# Patient Record
Sex: MALE | Race: WHITE | NOT HISPANIC OR LATINO | Employment: UNEMPLOYED | URBAN - METROPOLITAN AREA
[De-identification: names, ages, dates, MRNs, and addresses within clinical notes are randomized per-mention and may not be internally consistent; named-entity substitution may affect disease eponyms.]

---

## 2018-12-01 ENCOUNTER — HOSPITAL ENCOUNTER (INPATIENT)
Facility: HOSPITAL | Age: 48
LOS: 3 days | Discharge: HOME | DRG: 200 | End: 2018-12-04
Attending: EMERGENCY MEDICINE | Admitting: INTERNAL MEDICINE
Payer: MEDICAID

## 2018-12-01 DIAGNOSIS — S22.41XA CLOSED FRACTURE OF MULTIPLE RIBS OF RIGHT SIDE, INITIAL ENCOUNTER: Primary | ICD-10-CM

## 2018-12-01 DIAGNOSIS — J93.9 PNEUMOTHORAX: ICD-10-CM

## 2018-12-01 DIAGNOSIS — S27.0XXA TRAUMATIC PNEUMOTHORAX, INITIAL ENCOUNTER: ICD-10-CM

## 2018-12-01 DIAGNOSIS — R06.02 SHORTNESS OF BREATH: ICD-10-CM

## 2018-12-01 DIAGNOSIS — Z96.89 CHEST TUBE IN PLACE: ICD-10-CM

## 2018-12-01 DIAGNOSIS — R07.81 RIB PAIN ON RIGHT SIDE: ICD-10-CM

## 2018-12-01 PROBLEM — Z72.0 TOBACCO ABUSE: Status: ACTIVE | Noted: 2018-12-01

## 2018-12-01 PROBLEM — F10.10 ETOH ABUSE: Status: ACTIVE | Noted: 2018-12-01

## 2018-12-01 PROBLEM — I10 ESSENTIAL HYPERTENSION: Status: ACTIVE | Noted: 2018-12-01

## 2018-12-01 LAB
ALBUMIN SERPL BCP-MCNC: 4 G/DL
ALP SERPL-CCNC: 92 U/L
ALT SERPL W/O P-5'-P-CCNC: 17 U/L
ANION GAP SERPL CALC-SCNC: 15 MMOL/L
AST SERPL-CCNC: 27 U/L
BASOPHILS # BLD AUTO: 0.03 K/UL
BASOPHILS NFR BLD: 0.3 %
BILIRUB SERPL-MCNC: 0.7 MG/DL
BUN SERPL-MCNC: 16 MG/DL
CALCIUM SERPL-MCNC: 10.7 MG/DL
CHLORIDE SERPL-SCNC: 101 MMOL/L
CO2 SERPL-SCNC: 20 MMOL/L
CREAT SERPL-MCNC: 0.9 MG/DL
DIFFERENTIAL METHOD: ABNORMAL
EOSINOPHIL # BLD AUTO: 0.4 K/UL
EOSINOPHIL NFR BLD: 3.2 %
ERYTHROCYTE [DISTWIDTH] IN BLOOD BY AUTOMATED COUNT: 12.6 %
EST. GFR  (AFRICAN AMERICAN): >60 ML/MIN/1.73 M^2
EST. GFR  (NON AFRICAN AMERICAN): >60 ML/MIN/1.73 M^2
GLUCOSE SERPL-MCNC: 102 MG/DL
HCT VFR BLD AUTO: 48.6 %
HGB BLD-MCNC: 17.6 G/DL
LYMPHOCYTES # BLD AUTO: 2.3 K/UL
LYMPHOCYTES NFR BLD: 20.7 %
MCH RBC QN AUTO: 35.1 PG
MCHC RBC AUTO-ENTMCNC: 36.2 G/DL
MCV RBC AUTO: 97 FL
MONOCYTES # BLD AUTO: 1.3 K/UL
MONOCYTES NFR BLD: 11.9 %
NEUTROPHILS # BLD AUTO: 7 K/UL
NEUTROPHILS NFR BLD: 63.9 %
PLATELET # BLD AUTO: 329 K/UL
PLATELET BLD QL SMEAR: ABNORMAL
PMV BLD AUTO: 10.8 FL
POTASSIUM SERPL-SCNC: 3.7 MMOL/L
PROT SERPL-MCNC: 8.6 G/DL
RBC # BLD AUTO: 5.01 M/UL
SODIUM SERPL-SCNC: 136 MMOL/L
TROPONIN I SERPL DL<=0.01 NG/ML-MCNC: <0.006 NG/ML
WBC # BLD AUTO: 10.88 K/UL

## 2018-12-01 PROCEDURE — 27000221 HC OXYGEN, UP TO 24 HOURS

## 2018-12-01 PROCEDURE — 32551 INSERTION OF CHEST TUBE: CPT

## 2018-12-01 PROCEDURE — 11000001 HC ACUTE MED/SURG PRIVATE ROOM

## 2018-12-01 PROCEDURE — 93005 ELECTROCARDIOGRAM TRACING: CPT

## 2018-12-01 PROCEDURE — 63600175 PHARM REV CODE 636 W HCPCS: Performed by: REGISTERED NURSE

## 2018-12-01 PROCEDURE — 99291 CRITICAL CARE FIRST HOUR: CPT | Mod: 25

## 2018-12-01 PROCEDURE — 21400001 HC TELEMETRY ROOM

## 2018-12-01 PROCEDURE — 25000003 PHARM REV CODE 250: Performed by: EMERGENCY MEDICINE

## 2018-12-01 PROCEDURE — 80053 COMPREHEN METABOLIC PANEL: CPT

## 2018-12-01 PROCEDURE — 63600175 PHARM REV CODE 636 W HCPCS: Performed by: NURSE PRACTITIONER

## 2018-12-01 PROCEDURE — 96374 THER/PROPH/DIAG INJ IV PUSH: CPT

## 2018-12-01 PROCEDURE — 36415 COLL VENOUS BLD VENIPUNCTURE: CPT

## 2018-12-01 PROCEDURE — 96372 THER/PROPH/DIAG INJ SC/IM: CPT | Mod: 59

## 2018-12-01 PROCEDURE — 84484 ASSAY OF TROPONIN QUANT: CPT

## 2018-12-01 PROCEDURE — 0W9930Z DRAINAGE OF RIGHT PLEURAL CAVITY WITH DRAINAGE DEVICE, PERCUTANEOUS APPROACH: ICD-10-PCS | Performed by: EMERGENCY MEDICINE

## 2018-12-01 PROCEDURE — 63600175 PHARM REV CODE 636 W HCPCS: Performed by: EMERGENCY MEDICINE

## 2018-12-01 PROCEDURE — 93010 ELECTROCARDIOGRAM REPORT: CPT | Mod: ,,, | Performed by: INTERNAL MEDICINE

## 2018-12-01 PROCEDURE — 85025 COMPLETE CBC W/AUTO DIFF WBC: CPT

## 2018-12-01 RX ORDER — CHLORDIAZEPOXIDE HYDROCHLORIDE 10 MG/1
10 CAPSULE, GELATIN COATED ORAL 4 TIMES DAILY PRN
Status: DISCONTINUED | OUTPATIENT
Start: 2018-12-01 | End: 2018-12-02

## 2018-12-01 RX ORDER — MORPHINE SULFATE 4 MG/ML
4 INJECTION, SOLUTION INTRAMUSCULAR; INTRAVENOUS EVERY 4 HOURS PRN
Status: DISCONTINUED | OUTPATIENT
Start: 2018-12-01 | End: 2018-12-01 | Stop reason: SDUPTHER

## 2018-12-01 RX ORDER — MORPHINE SULFATE 4 MG/ML
6 INJECTION, SOLUTION INTRAMUSCULAR; INTRAVENOUS
Status: COMPLETED | OUTPATIENT
Start: 2018-12-01 | End: 2018-12-01

## 2018-12-01 RX ORDER — ETOMIDATE 2 MG/ML
10 INJECTION INTRAVENOUS
Status: COMPLETED | OUTPATIENT
Start: 2018-12-01 | End: 2018-12-01

## 2018-12-01 RX ORDER — HYDROCODONE BITARTRATE AND ACETAMINOPHEN 7.5; 325 MG/1; MG/1
1 TABLET ORAL EVERY 6 HOURS PRN
Status: DISCONTINUED | OUTPATIENT
Start: 2018-12-01 | End: 2018-12-02

## 2018-12-01 RX ORDER — ACETAMINOPHEN 325 MG/1
650 TABLET ORAL EVERY 8 HOURS PRN
Status: DISCONTINUED | OUTPATIENT
Start: 2018-12-01 | End: 2018-12-01 | Stop reason: SDUPTHER

## 2018-12-01 RX ORDER — LIDOCAINE HYDROCHLORIDE 10 MG/ML
10 INJECTION, SOLUTION EPIDURAL; INFILTRATION; INTRACAUDAL; PERINEURAL
Status: COMPLETED | OUTPATIENT
Start: 2018-12-01 | End: 2018-12-01

## 2018-12-01 RX ORDER — MORPHINE SULFATE 4 MG/ML
2 INJECTION, SOLUTION INTRAMUSCULAR; INTRAVENOUS EVERY 6 HOURS PRN
Status: DISCONTINUED | OUTPATIENT
Start: 2018-12-01 | End: 2018-12-02

## 2018-12-01 RX ORDER — HYDROMORPHONE HYDROCHLORIDE 2 MG/ML
1 INJECTION, SOLUTION INTRAMUSCULAR; INTRAVENOUS; SUBCUTANEOUS
Status: COMPLETED | OUTPATIENT
Start: 2018-12-01 | End: 2018-12-01

## 2018-12-01 RX ORDER — ACETAMINOPHEN 325 MG/1
650 TABLET ORAL EVERY 6 HOURS PRN
Status: DISCONTINUED | OUTPATIENT
Start: 2018-12-01 | End: 2018-12-04 | Stop reason: HOSPADM

## 2018-12-01 RX ORDER — ONDANSETRON 2 MG/ML
4 INJECTION INTRAMUSCULAR; INTRAVENOUS EVERY 12 HOURS PRN
Status: DISCONTINUED | OUTPATIENT
Start: 2018-12-01 | End: 2018-12-01 | Stop reason: SDUPTHER

## 2018-12-01 RX ORDER — ONDANSETRON 2 MG/ML
4 INJECTION INTRAMUSCULAR; INTRAVENOUS EVERY 8 HOURS PRN
Status: DISCONTINUED | OUTPATIENT
Start: 2018-12-01 | End: 2018-12-04 | Stop reason: HOSPADM

## 2018-12-01 RX ORDER — HYDROCODONE BITARTRATE AND ACETAMINOPHEN 5; 325 MG/1; MG/1
1 TABLET ORAL EVERY 4 HOURS PRN
Status: DISCONTINUED | OUTPATIENT
Start: 2018-12-01 | End: 2018-12-01 | Stop reason: SDUPTHER

## 2018-12-01 RX ORDER — PROMETHAZINE HYDROCHLORIDE 25 MG/ML
25 INJECTION, SOLUTION INTRAMUSCULAR; INTRAVENOUS
Status: COMPLETED | OUTPATIENT
Start: 2018-12-01 | End: 2018-12-01

## 2018-12-01 RX ORDER — HYDRALAZINE HYDROCHLORIDE 20 MG/ML
10 INJECTION INTRAMUSCULAR; INTRAVENOUS EVERY 6 HOURS PRN
Status: DISCONTINUED | OUTPATIENT
Start: 2018-12-01 | End: 2018-12-04 | Stop reason: HOSPADM

## 2018-12-01 RX ADMIN — ETOMIDATE 10 MG: 2 INJECTION INTRAVENOUS at 05:12

## 2018-12-01 RX ADMIN — MORPHINE SULFATE 6 MG: 4 INJECTION, SOLUTION INTRAMUSCULAR; INTRAVENOUS at 04:12

## 2018-12-01 RX ADMIN — MORPHINE SULFATE 2 MG: 4 INJECTION INTRAVENOUS at 11:12

## 2018-12-01 RX ADMIN — LIDOCAINE HYDROCHLORIDE 100 MG: 10 INJECTION, SOLUTION EPIDURAL; INFILTRATION; INTRACAUDAL; PERINEURAL at 05:12

## 2018-12-01 RX ADMIN — PROMETHAZINE HYDROCHLORIDE 25 MG: 25 INJECTION INTRAMUSCULAR; INTRAVENOUS at 04:12

## 2018-12-01 RX ADMIN — HYDROMORPHONE HYDROCHLORIDE 1 MG: 2 INJECTION INTRAMUSCULAR; INTRAVENOUS; SUBCUTANEOUS at 07:12

## 2018-12-01 NOTE — ED PROVIDER NOTES
"SCRIBE #1 NOTE: I, Kelly Rodríguez, am scribing for, and in the presence of, To Peoples Jr., NP. I have scribed the HPI, ROS, and PEx.     SCRIBE #2 NOTE: I, Mathieu Preston , am scribing for, and in the presence of,  Hugo Blankenship MD. I have scribed the remaining portions of the note not scribed by Scribe #1.     History      Chief Complaint   Patient presents with    Fall     pt tripped and fell yesterday, hit R chest wall on chair, pt states, "I think I broke my rib"       Review of patient's allergies indicates:   Allergen Reactions    Pcn [penicillins]         HPI   HPI    12/1/2018, 4:06 PM   History obtained from the patient      History of Present Illness: Lucas Bernal II is a 48 y.o. male patient with a PMHx of Hep C who presents to the Emergency Department for R rib pain which onset after tripping and falling yesterday. Pt reports hitting his R chest wall on a table. Pt has had a previous R rib injury/fracture. Symptoms are constant and moderate in severity.  No mitigating or exacerbating factors reported. No associated sxs reported. Patient denies any head injury/trauma, LOC,  HA, dizziness, extremity weakness/numbness, n/v, syncope, back pain, neck pain, abd pain, CP, SOB, other pain/injury, cough, and all other sxs at this time. Pt is a current daily smoker.  No further complaints or concerns at this time.         Arrival mode: Personal vehicle      PCP: Primary Doctor No       Past Medical History:  History reviewed. No pertinent past medical history.    Past Surgical History:  Past Surgical History:   Procedure Laterality Date    HERNIA REPAIR           Family History:  History reviewed. No pertinent family history.    Social History:  Social History     Tobacco Use    Smoking status: Current Every Day Smoker     Packs/day: 0.50     Types: Cigarettes   Substance and Sexual Activity    Alcohol use: No    Drug use: No    Sexual activity: Unknown       ROS   Review of Systems "   Constitutional: Negative for chills, diaphoresis and fever.   HENT: Negative for sore throat.    Respiratory: Negative for cough and shortness of breath.    Cardiovascular: Negative for chest pain.   Gastrointestinal: Negative for abdominal pain, nausea and vomiting.   Genitourinary: Negative for dysuria.   Musculoskeletal: Negative for back pain and neck pain.        + R rib pain -other pain/injury   Skin: Negative for rash.   Neurological: Negative for dizziness, syncope, weakness, numbness and headaches.        -head injury/trauma -LOC   Hematological: Does not bruise/bleed easily.   All other systems reviewed and are negative.      Physical Exam      Initial Vitals [12/01/18 1557]   BP Pulse Resp Temp SpO2   129/79 109 20 98 °F (36.7 °C) 96 %      MAP       --          Physical Exam  Nursing Notes and Vital Signs Reviewed.  Constitutional: Patient is in no acute distress.   Head: Atraumatic. Normocephalic.  Eyes: PERRL. EOM intact. Conjunctivae are not pale. No scleral icterus.  ENT: Mucous membranes are moist. Oropharynx is clear and symmetric.    Neck: Supple. Full ROM. No lymphadenopathy.  Cardiovascular: Regular rate. Regular rhythm. No murmurs, rubs, or gallops. Distal pulses are 2+ and symmetric.  Pulmonary/Chest: No respiratory distress. Clear to auscultation bilaterally. No wheezing or rales. R lateral rib tenderness.  Abdominal: Soft and non-distended.  There is no tenderness.  No rebound, guarding, or rigidity.   Musculoskeletal: Moves all extremities. No obvious deformities. No edema.   Skin: Warm and dry.  Neurological:  Alert, awake, and appropriate.  Normal speech.  No acute focal neurological deficits are appreciated.  Psychiatric: Normal affect. Good eye contact. Appropriate in content.    ED Course    Chest Tube  Date/Time: 12/1/2018 5:44 PM  Performed by: Hugo Blankenship Jr., MD  Authorized by: Hugo Blankenship Jr., MD   Consent Done: Yes  Consent: Written consent obtained.  Risks and  benefits: risks, benefits and alternatives were discussed  Consent given by: patient  Patient understanding: patient states understanding of the procedure being performed  Patient consent: the patient's understanding of the procedure matches consent given  Procedure consent: procedure consent matches procedure scheduled  Required items: required blood products, implants, devices, and special equipment available  Patient identity confirmed: name,  and MRN  Indications: pneumothorax  Patient sedated: yes  Tube size: 16 Frisian  Dressing: 4x4 sterile gauze  Post-insertion x-ray findings: tube in good position  Patient tolerance: Patient tolerated the procedure well with no immediate complications  Complications: No  Specimens: No  Implants: No    Procedural Sedation  Date/Time: 2018 5:44 PM  Performed by: Hugo Blankenship Jr., MD  Authorized by: Hugo Blankenship Jr., MD   Consent Done: Yes  Consent: Written consent obtained.  Risks and benefits: risks, benefits and alternatives were discussed  Consent given by: patient  Patient understanding: patient states understanding of the procedure being performed  Required items: required blood products, implants, devices, and special equipment available  Patient identity confirmed: , MRN and name  Equipment: on cardiac monitor., on CO2 monitor., on BP monitor., on supplemental oxygen., suction available. and airway equipment available.   Sedatives: etomidate (10 mg)  Sedation start date/time: 2018 5:46 PM  Sedation end date/time: 2018 6:06 PM  Vitals: Vital signs were monitored during sedation.  Complications: No complications.     Critical Care  Date/Time: 2018 6:40 PM  Performed by: Hugo Blankenship Jr., MD  Authorized by: Hugo Blankenship Jr., MD   Direct patient critical care time: 20 minutes  Additional history critical care time: 2 minutes  Ordering / reviewing critical care time: 4 minutes  Documentation critical care time: 4 minutes  Total  critical care time (exclusive of procedural time) : 30 minutes  Critical care time was exclusive of separately billable procedures and treating other patients and teaching time.  Critical care was necessary to treat or prevent imminent or life-threatening deterioration of the following conditions: Pneumothorax.  Critical care was time spent personally by me on the following activities: blood draw for specimens, development of treatment plan with patient or surrogate, interpretation of cardiac output measurements, evaluation of patient's response to treatment, examination of patient, obtaining history from patient or surrogate, ordering and performing treatments and interventions, ordering and review of laboratory studies, ordering and review of radiographic studies, pulse oximetry and re-evaluation of patient's condition.        ED Vital Signs:  Vitals:    12/01/18 1733 12/01/18 1748 12/01/18 1752 12/01/18 1752   BP: (!) 147/94 (!) 156/104  (!) 165/98   Pulse: 93 77     Resp: 20 (!) 22     Temp:       TempSrc:       SpO2: 100% 100% 100%    Weight:       Height:        12/01/18 1758 12/01/18 1800 12/01/18 1802 12/01/18 1805   BP: (!) 157/89 (!) 178/96 (!) 178/96 (!) 196/113   Pulse: 84 86 84 86   Resp: 16 12 (!) 27 16   Temp:       TempSrc:       SpO2: 100% 100% 100% 100%   Weight:       Height:        12/01/18 1810 12/01/18 1821 12/01/18 1825 12/01/18 1830   BP: (!) 190/108 (!) 177/105 (!) 175/104 (!) 184/99   Pulse: 82 67 84 82   Resp: 18 18 18 (!) 21   Temp:       TempSrc:       SpO2: 100% 100% 100% 100%   Weight:       Height:        12/01/18 1835 12/01/18 1840 12/01/18 1841   BP: (!) 168/103 (!) 175/101    Pulse: 76 74    Resp: 18 16    Temp:      TempSrc:      SpO2: 100% 100% 100%   Weight:      Height:          Abnormal Lab Results:  Labs Reviewed   CBC W/ AUTO DIFFERENTIAL - Abnormal; Notable for the following components:       Result Value    MCH 35.1 (*)     MCHC 36.2 (*)     Mono # 1.3 (*)     All other  components within normal limits   COMPREHENSIVE METABOLIC PANEL - Abnormal; Notable for the following components:    CO2 20 (*)     Calcium 10.7 (*)     Total Protein 8.6 (*)     All other components within normal limits   TROPONIN I   URINALYSIS, REFLEX TO URINE CULTURE        All Lab Results:  Results for orders placed or performed during the hospital encounter of 12/01/18   CBC auto differential   Result Value Ref Range    WBC 10.88 3.90 - 12.70 K/uL    RBC 5.01 4.60 - 6.20 M/uL    Hemoglobin 17.6 14.0 - 18.0 g/dL    Hematocrit 48.6 40.0 - 54.0 %    MCV 97 82 - 98 fL    MCH 35.1 (H) 27.0 - 31.0 pg    MCHC 36.2 (H) 32.0 - 36.0 g/dL    RDW 12.6 11.5 - 14.5 %    Platelets 329 150 - 350 K/uL    MPV 10.8 9.2 - 12.9 fL    Gran # (ANC) 7.0 1.8 - 7.7 K/uL    Lymph # 2.3 1.0 - 4.8 K/uL    Mono # 1.3 (H) 0.3 - 1.0 K/uL    Eos # 0.4 0.0 - 0.5 K/uL    Baso # 0.03 0.00 - 0.20 K/uL    Gran% 63.9 38.0 - 73.0 %    Lymph% 20.7 18.0 - 48.0 %    Mono% 11.9 4.0 - 15.0 %    Eosinophil% 3.2 0.0 - 8.0 %    Basophil% 0.3 0.0 - 1.9 %    Platelet Estimate Appears normal     Differential Method Automated    Comprehensive metabolic panel   Result Value Ref Range    Sodium 136 136 - 145 mmol/L    Potassium 3.7 3.5 - 5.1 mmol/L    Chloride 101 95 - 110 mmol/L    CO2 20 (L) 23 - 29 mmol/L    Glucose 102 70 - 110 mg/dL    BUN, Bld 16 6 - 20 mg/dL    Creatinine 0.9 0.5 - 1.4 mg/dL    Calcium 10.7 (H) 8.7 - 10.5 mg/dL    Total Protein 8.6 (H) 6.0 - 8.4 g/dL    Albumin 4.0 3.5 - 5.2 g/dL    Total Bilirubin 0.7 0.1 - 1.0 mg/dL    Alkaline Phosphatase 92 55 - 135 U/L    AST 27 10 - 40 U/L    ALT 17 10 - 44 U/L    Anion Gap 15 8 - 16 mmol/L    eGFR if African American >60 >60 mL/min/1.73 m^2    eGFR if non African American >60 >60 mL/min/1.73 m^2   Troponin I   Result Value Ref Range    Troponin I <0.006 0.000 - 0.026 ng/mL         Imaging Results:  Imaging Results          X-Ray Chest 1 View (Final result)  Result time 12/01/18 18:37:59    Final  result by Benjamin Lobo MD (12/01/18 18:37:59)                 Impression:      See above.      Electronically signed by: Benjamin Lobo MD  Date:    12/01/2018  Time:    18:37             Narrative:    EXAMINATION:  XR CHEST 1 VIEW    CLINICAL HISTORY:  Right-sided chest tube placement.  Right-sided pneumothorax.    FINDINGS:  Right chest tube tip projects to the right hilum.  No residual pneumothorax noted.  There is some subcutaneous emphysema.                               X-Ray Chest PA And Lateral (Final result)  Result time 12/01/18 17:21:30    Final result by Benjamin Lobo MD (12/01/18 17:21:30)                 Impression:      Right-sided pneumothorax as described above.  Multiple right-sided rib fractures.  See rib dictation.      Electronically signed by: Benjamin Lobo MD  Date:    12/01/2018  Time:    17:21             Narrative:    EXAMINATION:  XR CHEST PA AND LATERAL    CLINICAL HISTORY:  Right-sided pneumothorax    COMPARISON:  Earlier single-view chest x-ray.    FINDINGS:  Multiple right-sided rib fractures again noted.  Right-sided pneumothorax noted predominantly along the right lower lateral lung base and along the right lung base.  There is also air tracking along the medial aspect of the right lung.  No air at the right apex likely related to extensive scarring in the right upper lobe.                               X-Ray Ribs 2 View Right (Final result)  Result time 12/01/18 17:10:05    Final result by Benjamin Lobo MD (12/01/18 17:10:05)                 Impression:      Multiple right-sided rib fractures with right-sided pneumothorax.  Please see chest x-ray dictation.      Electronically signed by: Benjamin Lobo MD  Date:    12/01/2018  Time:    17:10             Narrative:    EXAMINATION:  XR RIBS 2 VIEW RIGHT    CLINICAL HISTORY:  Pleurodynia, chest pain.  Trauma, initial encounter.    COMPARISON:  Chest x-ray    FINDINGS:  There are fractures involving the posterior right 56 8 and 9th ribs.   Right-sided pneumothorax tracking down the right lateral chest wall and along the right lung base.  There is subcutaneous emphysema.                               X-Ray Chest 1 View (Final result)  Result time 12/01/18 17:08:50    Final result by Benjamin Lobo MD (12/01/18 17:08:50)                 Impression:      Multiple right-sided rib fractures with right-sided pneumothorax.  Finding identified at 17:05 hours and discussed with Dr. Andino in the emergency room at 17:07 hours.      Electronically signed by: Benjamin Lobo MD  Date:    12/01/2018  Time:    17:08             Narrative:    EXAMINATION:  XR CHEST 1 VIEW    CLINICAL HISTORY:  Chest trauma, initial encounter    FINDINGS:  There are fractures involving the right posterior 5th 6th 8th and 9th ribs with a moderate right-sided pneumothorax tracking up the right lateral chest wall.  The heart size is normal.  No mediastinal shift.  Scarring right pulmonary apex.  The left lung is clear.                               The EKG was ordered, reviewed, and independently interpreted by the ED provider.  Interpretation time: 1715  Rate: 80 BPM  Rhythm: normal sinus rhythm with sinus arrhythmia  Interpretation: Right atrial enlargement. No STEMI.             The Emergency Provider reviewed the vital signs and test results, which are outlined above.    ED Discussion     5:15 PM: To Peoples Jr., NP discussed the pt's case with Dr. Pimentel (General Surgery) who recommends chest tube (16 Lithuanian) and admission to Hospital Medicine.    5:24 PM: To Peoples Jr., NP transfers care of pt to Dr. Blankenship for chest tube placement and pending admission.    5:42 PM: To Peoples Jr., NP discussed case with Faye Norton NP (Hospital Medicine). Dr. Prince agrees with current care and management of pt and accepts admission.   Admitting Service: Hospital medicine   Admitting Physician: Dr. Prince  Admit to: Med-Tele      6:41 PM: Re-evaluated pt. I have discussed test  results, shared treatment plan, and the need for admission with patient and family at bedside. Pt and family express understanding at this time and agree with all information. All questions answered. Pt and family have no further questions or concerns at this time. Pt is ready for admit.    6:50 PM  Patient is stable nontoxic head admit.  Dr. Prince is admitting.  Surgery will be managing chest tube.  Chest tube was placed by myself in the emergency department.  The chest x-ray confirms placement.    ED Medication(s):  Medications   hydromorphone (PF) injection 1 mg (not administered)   ondansetron injection 4 mg (not administered)   acetaminophen tablet 650 mg (not administered)   promethazine (PHENERGAN) 6.25 mg in dextrose 5 % 50 mL IVPB (not administered)   morphine injection 2 mg (not administered)   morphine injection 6 mg (6 mg Intramuscular Given 12/1/18 1621)   promethazine injection 25 mg (25 mg Intramuscular Given 12/1/18 1622)   etomidate injection 10 mg (10 mg Intravenous Given 12/1/18 1744)   lidocaine (PF) 10 mg/ml (1%) injection 100 mg (100 mg Infiltration Given 12/1/18 1742)             Medical Decision Making    Medical Decision Making:   Clinical Tests:   Lab Tests: Ordered and Reviewed  Radiological Study: Ordered and Reviewed  Medical Tests: Ordered and Reviewed           Scribe Attestation:   Scribe #1: I performed the above scribed service and the documentation accurately describes the services I performed. I attest to the accuracy of the note.    Attending:   Physician Attestation Statement for Scribe #1: I, To Peoples Jr., NP , personally performed the services described in this documentation, as scribed by Kelly Rodríguez, in my presence, and it is both accurate and complete.       Scribe Attestation:   Scribe #2: I performed the above scribed service and the documentation accurately describes the services I performed. I attest to the accuracy of the note.    Attending  Attestation:           Physician Attestation for Scribe:    Physician Attestation Statement for Scribe #2: I, Hugo Blankenship MD, reviewed documentation, as scribed by Mathieu Preston in my presence, and it is both accurate and complete. I also acknowledge and confirm the content of the note done by Scribe #1.          Clinical Impression       ICD-10-CM ICD-9-CM   1. Rib pain on right side R07.81 786.50   2. Rib pain on right side R07.81 786.50   3. Pneumothorax J93.9 512.89   4. Shortness of breath R06.02 786.05   5. Chest tube in place Z96.89 V49.89       Disposition:   Disposition: Admitted  Condition: Serious         Hugo Blankenship Jr., MD  12/01/18 2924

## 2018-12-02 LAB
ALBUMIN SERPL BCP-MCNC: 3.1 G/DL
ALP SERPL-CCNC: 75 U/L
ALT SERPL W/O P-5'-P-CCNC: 14 U/L
ANION GAP SERPL CALC-SCNC: 10 MMOL/L
ANION GAP SERPL CALC-SCNC: 12 MMOL/L
AST SERPL-CCNC: 24 U/L
BASOPHILS # BLD AUTO: 0.04 K/UL
BASOPHILS NFR BLD: 0.4 %
BILIRUB SERPL-MCNC: 0.6 MG/DL
BILIRUB UR QL STRIP: NEGATIVE
BUN SERPL-MCNC: 13 MG/DL
BUN SERPL-MCNC: 14 MG/DL
CALCIUM SERPL-MCNC: 9.2 MG/DL
CALCIUM SERPL-MCNC: 9.3 MG/DL
CHLORIDE SERPL-SCNC: 101 MMOL/L
CHLORIDE SERPL-SCNC: 102 MMOL/L
CLARITY UR: CLEAR
CO2 SERPL-SCNC: 23 MMOL/L
CO2 SERPL-SCNC: 25 MMOL/L
COLOR UR: YELLOW
CREAT SERPL-MCNC: 0.8 MG/DL
CREAT SERPL-MCNC: 0.8 MG/DL
DIFFERENTIAL METHOD: ABNORMAL
EOSINOPHIL # BLD AUTO: 0.4 K/UL
EOSINOPHIL NFR BLD: 4.1 %
ERYTHROCYTE [DISTWIDTH] IN BLOOD BY AUTOMATED COUNT: 11.8 %
EST. GFR  (AFRICAN AMERICAN): >60 ML/MIN/1.73 M^2
EST. GFR  (AFRICAN AMERICAN): >60 ML/MIN/1.73 M^2
EST. GFR  (NON AFRICAN AMERICAN): >60 ML/MIN/1.73 M^2
EST. GFR  (NON AFRICAN AMERICAN): >60 ML/MIN/1.73 M^2
GLUCOSE SERPL-MCNC: 86 MG/DL
GLUCOSE SERPL-MCNC: 86 MG/DL
GLUCOSE UR QL STRIP: NEGATIVE
HCT VFR BLD AUTO: 42.5 %
HGB BLD-MCNC: 14.4 G/DL
HGB UR QL STRIP: NEGATIVE
KETONES UR QL STRIP: NEGATIVE
LEUKOCYTE ESTERASE UR QL STRIP: NEGATIVE
LYMPHOCYTES # BLD AUTO: 2.8 K/UL
LYMPHOCYTES NFR BLD: 30.1 %
MCH RBC QN AUTO: 33.7 PG
MCHC RBC AUTO-ENTMCNC: 33.9 G/DL
MCV RBC AUTO: 100 FL
MONOCYTES # BLD AUTO: 1.1 K/UL
MONOCYTES NFR BLD: 11.4 %
NEUTROPHILS # BLD AUTO: 5 K/UL
NEUTROPHILS NFR BLD: 54 %
NITRITE UR QL STRIP: NEGATIVE
PH UR STRIP: 6 [PH] (ref 5–8)
PLATELET # BLD AUTO: 286 K/UL
PMV BLD AUTO: 11 FL
POTASSIUM SERPL-SCNC: 3.4 MMOL/L
POTASSIUM SERPL-SCNC: 3.4 MMOL/L
PROT SERPL-MCNC: 6.5 G/DL
PROT UR QL STRIP: NEGATIVE
RBC # BLD AUTO: 4.27 M/UL
SODIUM SERPL-SCNC: 136 MMOL/L
SODIUM SERPL-SCNC: 137 MMOL/L
SP GR UR STRIP: 1.01 (ref 1–1.03)
URN SPEC COLLECT METH UR: NORMAL
UROBILINOGEN UR STRIP-ACNC: NEGATIVE EU/DL
WBC # BLD AUTO: 9.23 K/UL

## 2018-12-02 PROCEDURE — 11000001 HC ACUTE MED/SURG PRIVATE ROOM

## 2018-12-02 PROCEDURE — 94761 N-INVAS EAR/PLS OXIMETRY MLT: CPT

## 2018-12-02 PROCEDURE — 21400001 HC TELEMETRY ROOM

## 2018-12-02 PROCEDURE — S4991 NICOTINE PATCH NONLEGEND: HCPCS | Performed by: NURSE PRACTITIONER

## 2018-12-02 PROCEDURE — 63600175 PHARM REV CODE 636 W HCPCS: Performed by: NURSE PRACTITIONER

## 2018-12-02 PROCEDURE — 80048 BASIC METABOLIC PNL TOTAL CA: CPT

## 2018-12-02 PROCEDURE — 81003 URINALYSIS AUTO W/O SCOPE: CPT

## 2018-12-02 PROCEDURE — 99232 SBSQ HOSP IP/OBS MODERATE 35: CPT | Mod: ,,, | Performed by: SURGERY

## 2018-12-02 PROCEDURE — 25000003 PHARM REV CODE 250: Performed by: NURSE PRACTITIONER

## 2018-12-02 PROCEDURE — 80053 COMPREHEN METABOLIC PANEL: CPT

## 2018-12-02 PROCEDURE — 85025 COMPLETE CBC W/AUTO DIFF WBC: CPT

## 2018-12-02 RX ORDER — IBUPROFEN 200 MG
1 TABLET ORAL DAILY
Status: DISCONTINUED | OUTPATIENT
Start: 2018-12-02 | End: 2018-12-04 | Stop reason: HOSPADM

## 2018-12-02 RX ORDER — HYDROCODONE BITARTRATE AND ACETAMINOPHEN 10; 325 MG/1; MG/1
1 TABLET ORAL EVERY 6 HOURS PRN
Status: DISCONTINUED | OUTPATIENT
Start: 2018-12-02 | End: 2018-12-04 | Stop reason: HOSPADM

## 2018-12-02 RX ORDER — THIAMINE HCL 100 MG
100 TABLET ORAL DAILY
Status: DISCONTINUED | OUTPATIENT
Start: 2018-12-03 | End: 2018-12-04 | Stop reason: HOSPADM

## 2018-12-02 RX ORDER — MORPHINE SULFATE 2 MG/ML
2 INJECTION, SOLUTION INTRAMUSCULAR; INTRAVENOUS ONCE
Status: COMPLETED | OUTPATIENT
Start: 2018-12-02 | End: 2018-12-02

## 2018-12-02 RX ORDER — HYDROCODONE BITARTRATE AND ACETAMINOPHEN 10; 325 MG/1; MG/1
1 TABLET ORAL EVERY 6 HOURS PRN
Status: DISCONTINUED | OUTPATIENT
Start: 2018-12-02 | End: 2018-12-02

## 2018-12-02 RX ORDER — MORPHINE SULFATE 2 MG/ML
2 INJECTION, SOLUTION INTRAMUSCULAR; INTRAVENOUS EVERY 4 HOURS PRN
Status: DISCONTINUED | OUTPATIENT
Start: 2018-12-02 | End: 2018-12-04 | Stop reason: HOSPADM

## 2018-12-02 RX ORDER — SODIUM CHLORIDE 9 MG/ML
INJECTION, SOLUTION INTRAVENOUS CONTINUOUS
Status: DISCONTINUED | OUTPATIENT
Start: 2018-12-02 | End: 2018-12-03

## 2018-12-02 RX ORDER — LISINOPRIL 10 MG/1
10 TABLET ORAL DAILY
Status: DISCONTINUED | OUTPATIENT
Start: 2018-12-02 | End: 2018-12-03

## 2018-12-02 RX ORDER — CHLORDIAZEPOXIDE HYDROCHLORIDE 10 MG/1
10 CAPSULE, GELATIN COATED ORAL 3 TIMES DAILY
Status: DISCONTINUED | OUTPATIENT
Start: 2018-12-02 | End: 2018-12-04 | Stop reason: HOSPADM

## 2018-12-02 RX ORDER — LORAZEPAM 0.5 MG/1
1 TABLET ORAL EVERY 6 HOURS PRN
Status: DISCONTINUED | OUTPATIENT
Start: 2018-12-02 | End: 2018-12-04 | Stop reason: HOSPADM

## 2018-12-02 RX ORDER — POTASSIUM CHLORIDE 20 MEQ/1
40 TABLET, EXTENDED RELEASE ORAL ONCE
Status: COMPLETED | OUTPATIENT
Start: 2018-12-02 | End: 2018-12-02

## 2018-12-02 RX ADMIN — FOLIC ACID: 5 INJECTION, SOLUTION INTRAMUSCULAR; INTRAVENOUS; SUBCUTANEOUS at 10:12

## 2018-12-02 RX ADMIN — MORPHINE SULFATE 2 MG: 2 INJECTION, SOLUTION INTRAMUSCULAR; INTRAVENOUS at 08:12

## 2018-12-02 RX ADMIN — LISINOPRIL 10 MG: 10 TABLET ORAL at 10:12

## 2018-12-02 RX ADMIN — MORPHINE SULFATE 2 MG: 2 INJECTION, SOLUTION INTRAMUSCULAR; INTRAVENOUS at 04:12

## 2018-12-02 RX ADMIN — CHLORDIAZEPOXIDE HYDROCHLORIDE 10 MG: 10 CAPSULE ORAL at 03:12

## 2018-12-02 RX ADMIN — SODIUM CHLORIDE: 0.9 INJECTION, SOLUTION INTRAVENOUS at 05:12

## 2018-12-02 RX ADMIN — HYDROCODONE BITARTRATE AND ACETAMINOPHEN 1 TABLET: 10; 325 TABLET ORAL at 10:12

## 2018-12-02 RX ADMIN — CHLORDIAZEPOXIDE HYDROCHLORIDE 10 MG: 10 CAPSULE ORAL at 08:12

## 2018-12-02 RX ADMIN — POTASSIUM CHLORIDE 40 MEQ: 1500 TABLET, EXTENDED RELEASE ORAL at 05:12

## 2018-12-02 RX ADMIN — NICOTINE 1 PATCH: 21 PATCH, EXTENDED RELEASE TRANSDERMAL at 10:12

## 2018-12-02 RX ADMIN — MORPHINE SULFATE 2 MG: 2 INJECTION, SOLUTION INTRAMUSCULAR; INTRAVENOUS at 05:12

## 2018-12-02 NOTE — ASSESSMENT & PLAN NOTE
- Pt reports has a hx of HTN but doesn't know the name of the medication. Pt reports he has been out for awhile. Pt reports he fills at [facility] in [place] but pharmacy is currently closed. Pt currently denies chest pain or equivalent. Will call tomorrow and obtain updated medication list  - Hydralazine PRN  12/2:  --remains elevated  --lisinopril 10mg started today

## 2018-12-02 NOTE — ASSESSMENT & PLAN NOTE
- Pt reports drinking a least six pack of beer/day. Monitor for withdrawals. Librium PRN  12/2:  --bananna bag today   --MVI, thiamine & Folbic daily starting tomorrow  --librium tid  --prn ativan for seizure/withdrawals  --monitor

## 2018-12-02 NOTE — ASSESSMENT & PLAN NOTE
- Pt educated on smoking cessation for about 3-10 minutes, pt verbalized understanding. Pt declined nicotine patch at this time.

## 2018-12-02 NOTE — PLAN OF CARE
Problem: Patient Care Overview  Goal: Plan of Care Review  Outcome: Ongoing (interventions implemented as appropriate)  PT complains of pain to right side of thoracic cavity. Pain medication is effective. Dressing is dry and intact. Chest tube connected to wall suction. Bedrest is promoted. No injuries. Will to continue to monitor. 12 hor chart check is completed.

## 2018-12-02 NOTE — ASSESSMENT & PLAN NOTE
- Admit to [facility]  - General surgery following  - ED placed right side chest tube for pneumothorax  - Repeat CXR in AM  - Supplemental oxygen prn, keep O2 sats > 92%  - Analgesics PRN

## 2018-12-02 NOTE — PROGRESS NOTES
[facility] Hospital Medicine  Progress Note    Patient Name: Lucas Bernal II  MRN: [id number]  Patient Class: IP- Inpatient   Admission Date: 12/1/2018  Length of Stay: 1 days  Attending Physician: Pavel Prince MD  Primary Care Provider: Primary Doctor No        Subjective:     Principal Problem:Pneumothorax    HPI:  Lucas Bernal II is a 48 year old male with a PMHx of HTN, Tobacco abuse (1/2 PPD), and ETOH abuse (admit to drinking six pack of beer/day) who presented to the Emergency Department with c/o right side rib pain. Pt reports he tripped over his boot and fell on the edge of the table. CBC/CMP unremarkable. Troponin < 0.006. CXR showed right side pneumothorax and multiple right side rib fractures. Right rib xray with same findings. ED discussed case with General Surgery who recommended CT (16F) and admission to .       Interval History: Patient was admitted to Med Surg for pneumothorax under the care of Hospital Medicine. General surgery placed R side chest tube. Patient reports he is breathing better now. Continue current plan    Review of Systems   Constitutional: Positive for activity change. Negative for chills, diaphoresis, fatigue and fever.   HENT: Negative for congestion, rhinorrhea, sinus pain and sore throat.    Eyes: Negative for pain and visual disturbance.   Respiratory: Positive for wheezing. Negative for apnea, cough, choking, chest tightness, shortness of breath and stridor.    Cardiovascular: Negative for chest pain, palpitations and leg swelling.   Gastrointestinal: Negative for abdominal pain, diarrhea, nausea and vomiting.   Endocrine: Negative for cold intolerance and heat intolerance.   Genitourinary: Negative for difficulty urinating, dysuria and hematuria.   Musculoskeletal: Positive for arthralgias (To R side/chest wall) and myalgias (to R side chest wall).   Skin: Positive for wound (surgical - chest tube insertion sight). Negative for color change.    Allergic/Immunologic: Negative.    Neurological: Negative for dizziness, tremors, seizures, facial asymmetry, speech difficulty, weakness, numbness and headaches.   Hematological: Negative.    Psychiatric/Behavioral: Negative for agitation, behavioral problems and confusion. The patient is not nervous/anxious.      Objective:     Vital Signs (Most Recent):  Temp: 97.7 °F (36.5 °C) (12/02/18 0756)  Pulse: (!) 54 (12/02/18 0905)  Resp: 16 (12/02/18 0756)  BP: (!) 145/77 (12/02/18 0756)  SpO2: 96 % (12/02/18 0756) Vital Signs (24h Range):  Temp:  [97.6 °F (36.4 °C)-98.6 °F (37 °C)] 97.7 °F (36.5 °C)  Pulse:  [] 54  Resp:  [12-27] 16  SpO2:  [96 %-100 %] 96 %  BP: (129-196)/() 145/77     Weight: 54.2 kg (119 lb 9.6 oz)  Body mass index is 19.3 kg/m².    Intake/Output Summary (Last 24 hours) at 12/2/2018 0918  Last data filed at 12/2/2018 0523  Gross per 24 hour   Intake 121 ml   Output 307 ml   Net -186 ml      Physical Exam   Constitutional: He is oriented to person, place, and time. He appears well-developed and well-nourished. He is cooperative. He is easily aroused. He appears ill. No distress. Nasal cannula in place.   HENT:   Head: Normocephalic and atraumatic.   Nose: Nose normal.   Eyes: Conjunctivae and EOM are normal. No scleral icterus.   Neck: Normal range of motion. Neck supple. No JVD present.   Cardiovascular: Normal rate, regular rhythm, normal heart sounds and intact distal pulses.   No murmur heard.  Pulmonary/Chest: Effort normal. No respiratory distress. He has decreased breath sounds in the right middle field and the right lower field. He has wheezes. He has rales.   Right side chest tube   Abdominal: Soft. Bowel sounds are normal. He exhibits no distension. There is no tenderness. There is no rebound and no guarding.   Genitourinary:   Genitourinary Comments: Deferred   Musculoskeletal: He exhibits no edema or deformity.   Neurological: He is alert, oriented to person, place, and time  and easily aroused. No cranial nerve deficit. Coordination normal.   Skin: Skin is warm and dry. Capillary refill takes less than 2 seconds. He is not diaphoretic.   Psychiatric: He has a normal mood and affect. His behavior is normal. Judgment and thought content normal.     Assistance with smoking cessation was offered, including:  [x]  Medications  [x]  Counseling  [x]  Printed Information on Smoking Cessation  []  Referral to a Smoking Cessation Program    Patient was counseled regarding smoking for 3-10 minutes.      Significant Labs:   Recent Lab Results       12/02/18  0437   12/01/18  1703        Albumin 3.1 4.0     Alkaline Phosphatase 75 92     ALT 14 17     Anion Gap 12 15      10       AST 24 27     Baso # 0.04 0.03     Basophil% 0.4 0.3     Total Bilirubin 0.6  Comment:  For infants and newborns, interpretation of results should be based  on gestational age, weight and in agreement with clinical  observations.  Premature Infant recommended reference ranges:  Up to 24 hours.............<8.0 mg/dL  Up to 48 hours............<12.0 mg/dL  3-5 days..................<15.0 mg/dL  6-29 days.................<15.0 mg/dL   0.7  Comment:  For infants and newborns, interpretation of results should be based  on gestational age, weight and in agreement with clinical  observations.  Premature Infant recommended reference ranges:  Up to 24 hours.............<8.0 mg/dL  Up to 48 hours............<12.0 mg/dL  3-5 days..................<15.0 mg/dL  6-29 days.................<15.0 mg/dL       BUN, Bld 14 16      13       Calcium 9.2 10.7      9.3       Chloride 102 101      101       CO2 23 20      25       Creatinine 0.8 0.9      0.8       Differential Method Automated Automated     eGFR if  >60 >60      >60       eGFR if non  >60  Comment:  Calculation used to obtain the estimated glomerular filtration  rate (eGFR) is the CKD-EPI equation.    >60  Comment:  Calculation used to obtain the  estimated glomerular filtration  rate (eGFR) is the CKD-EPI equation.         >60  Comment:  Calculation used to obtain the estimated glomerular filtration  rate (eGFR) is the CKD-EPI equation.          Eos # 0.4 0.4     Eosinophil% 4.1 3.2     Glucose 86 102      86       Gran # (ANC) 5.0 7.0     Gran% 54.0 63.9     Hematocrit 42.5 48.6     Hemoglobin 14.4 17.6     Lymph # 2.8 2.3     Lymph% 30.1 20.7     MCH 33.7 35.1     MCHC 33.9 36.2      97     Mono # 1.1 1.3     Mono% 11.4 11.9     MPV 11.0 10.8     Platelet Estimate   Appears normal     Platelets 286 329     Potassium 3.4 3.7      3.4       Total Protein 6.5 8.6     RBC 4.27 5.01     RDW 11.8 12.6     Sodium 137 136      136       Troponin I   <0.006  Comment:  The reference interval for Troponin I represents the 99th percentile   cutoff   for our facility and is consistent with 3rd generation assay   performance.       WBC 9.23 10.88         All pertinent labs within the past 24 hours have been reviewed.    Significant Imaging: I have reviewed all pertinent imaging results/findings within the past 24 hours.    Assessment/Plan:      * Pneumothorax    - Admit to Med-Tele  - General surgery following  - ED placed right side chest tube for pneumothorax  - Repeat CXR in AM  - Supplemental oxygen prn, keep O2 sats > 92%  - Analgesics PRN  12/2:  --Chest tube managed by gen surgery  --CXR shows no pneumothorax   --supplemental oxygen  --PRN analgesia  --will need chest tube 24-48 hours more, then clamp, then remove if lung stays inflated  --general surgery following       ETOH abuse    - Pt reports drinking a least six pack of beer/day. Monitor for withdrawals. Librium PRN  12/2:  --bananna bag today   --MVI, thiamine & Folbic daily starting tomorrow  --librium tid  --prn ativan for seizure/withdrawals  --monitor       Tobacco abuse    --cessation counseling  --nicotine patch       Essential hypertension    - Pt reports has a hx of HTN but doesn't know the  name of the medication. Pt reports he has been out for awhile. Pt reports he fills at [facility] in [place] but pharmacy is currently closed. Pt currently denies chest pain or equivalent. Will call tomorrow and obtain updated medication list  - Hydralazine PRN  12/2:  --remains elevated  --lisinopril 10mg started today     Closed fracture of multiple ribs of right side    - Analgesics PRN           VTE Risk Mitigation (From admission, onward)        Ordered     IP VTE LOW RISK PATIENT  Once      12/01/18 2244     Place MARIA hose  Until discontinued      12/01/18 2244              GAGANDEEP Richards-C  Department of Hospital Medicine   [facility]

## 2018-12-02 NOTE — ED NOTES
16 fr chest tube right lateral ribs inserted per jerry mack put to [facility] chest drainage set tube connections taped secure and taped secure to chest wall initial bubbling in water seal them put to wall suction at 2cm suction -20 cm suction

## 2018-12-02 NOTE — PLAN OF CARE
CM met with patient at the bedside to assess for discharge needs.  Patient states that address listed on the facesheet is not correct.  Address corrected.  Patient states that he has a new phone number but does not know the number.  He lives at home with his stepson and is independent with needs.  He does not have a PCP.  CM provided patient with information to schedule an appointment at [facility].  His preferred pharmacy is [facility].  Patient is able to drive, however his vehicle in not operable at this time.  CM provided patient with the number for Medicaid transportation so that he can get to medical appointments.   No other needs at this time.  CM provided a transitional care folder, information on advanced directives, information on pharmacy bedside delivery, and discharge planning begins on admission with contact information for any needs/questions.     D/C Plan:  Home with no needs    No Pharmacies Listed  Primary Doctor No  Payor: MEDICAID / Plan: [place] / Product Type: Managed Medicaid /       12/02/18 1319   Discharge Assessment   Assessment Type Discharge Planning Assessment   Confirmed/corrected address and phone number on facesheet? Yes   Assessment information obtained from? Patient;Medical Record   Expected Length of Stay (days) (TBD)   Communicated expected length of stay with patient/caregiver yes   Prior to hospitilization cognitive status: Alert/Oriented   Prior to hospitalization functional status: Independent   Current cognitive status: Alert/Oriented   Current Functional Status: Independent   Facility Arrived From: home   Lives With other (see comments)  ([facility])   Able to Return to Prior Arrangements yes   Is patient able to care for self after discharge? Yes   Who are your caregiver(s) and their phone number(s)? Rob Kaur, friend [phone]   Patient's perception of discharge disposition home or selfcare   Readmission Within The Last 30 Days no  previous admission in last 30 days   Patient currently being followed by outpatient case management? No   Patient currently receives any other outside agency services? No   Equipment Currently Used at Home none   Do you have any problems affording any of your prescribed medications? No   Is the patient taking medications as prescribed? yes   Does the patient have transportation home? Yes   Transportation Available family or friend will provide   Dialysis Name and Scheduled days NA   Does the patient receive services at the Coumadin Clinic? No   Discharge Plan A Home   Patient/Family In Agreement With Plan yes

## 2018-12-02 NOTE — ASSESSMENT & PLAN NOTE
- Admit to [facility]  - General surgery following  - ED placed right side chest tube for pneumothorax  - Repeat CXR in AM  - Supplemental oxygen prn, keep O2 sats > 92%  - Analgesics PRN  12/2:  --Chest tube managed by gen surgery  --CXR shows no pneumothorax   --supplemental oxygen  --PRN analgesia  --will need chest tube 24-48 hours more, then clamp, then remove if lung stays inflated  --general surgery following

## 2018-12-02 NOTE — SUBJECTIVE & OBJECTIVE
Interval History: Patient was admitted to [facility] for pneumothorax under the care of [facility]. General surgery placed R side chest tube. Patient reports he is breathing better now. Continue current plan    Review of Systems   Constitutional: Positive for activity change. Negative for chills, diaphoresis, fatigue and fever.   HENT: Negative for congestion, rhinorrhea, sinus pain and sore throat.    Eyes: Negative for pain and visual disturbance.   Respiratory: Positive for wheezing. Negative for apnea, cough, choking, chest tightness, shortness of breath and stridor.    Cardiovascular: Negative for chest pain, palpitations and leg swelling.   Gastrointestinal: Negative for abdominal pain, diarrhea, nausea and vomiting.   Endocrine: Negative for cold intolerance and heat intolerance.   Genitourinary: Negative for difficulty urinating, dysuria and hematuria.   Musculoskeletal: Positive for arthralgias (To R side/chest wall) and myalgias (to R side chest wall).   Skin: Positive for wound (surgical - chest tube insertion sight). Negative for color change.   Allergic/Immunologic: Negative.    Neurological: Negative for dizziness, tremors, seizures, facial asymmetry, speech difficulty, weakness, numbness and headaches.   Hematological: Negative.    Psychiatric/Behavioral: Negative for agitation, behavioral problems and confusion. The patient is not nervous/anxious.      Objective:     Vital Signs (Most Recent):  Temp: 97.7 °F (36.5 °C) (12/02/18 0756)  Pulse: (!) 54 (12/02/18 0905)  Resp: 16 (12/02/18 0756)  BP: (!) 145/77 (12/02/18 0756)  SpO2: 96 % (12/02/18 0756) Vital Signs (24h Range):  Temp:  [97.6 °F (36.4 °C)-98.6 °F (37 °C)] 97.7 °F (36.5 °C)  Pulse:  [] 54  Resp:  [12-27] 16  SpO2:  [96 %-100 %] 96 %  BP: (129-196)/() 145/77     Weight: 54.2 kg (119 lb 9.6 oz)  Body mass index is 19.3 kg/m².    Intake/Output Summary (Last 24 hours) at 12/2/2018 0918  Last data filed at 12/2/2018 0591  Gross  per 24 hour   Intake 121 ml   Output 307 ml   Net -186 ml      Physical Exam   Constitutional: He is oriented to person, place, and time. He appears well-developed and well-nourished. He is cooperative. He is easily aroused. He appears ill. No distress. Nasal cannula in place.   HENT:   Head: Normocephalic and atraumatic.   Nose: Nose normal.   Eyes: Conjunctivae and EOM are normal. No scleral icterus.   Neck: Normal range of motion. Neck supple. No JVD present.   Cardiovascular: Normal rate, regular rhythm, normal heart sounds and intact distal pulses.   No murmur heard.  Pulmonary/Chest: Effort normal. No respiratory distress. He has decreased breath sounds in the right middle field and the right lower field. He has wheezes. He has rales.   Right side chest tube   Abdominal: Soft. Bowel sounds are normal. He exhibits no distension. There is no tenderness. There is no rebound and no guarding.   Genitourinary:   Genitourinary Comments: Deferred   Musculoskeletal: He exhibits no edema or deformity.   Neurological: He is alert, oriented to person, place, and time and easily aroused. No cranial nerve deficit. Coordination normal.   Skin: Skin is warm and dry. Capillary refill takes less than 2 seconds. He is not diaphoretic.   Psychiatric: He has a normal mood and affect. His behavior is normal. Judgment and thought content normal.     Assistance with smoking cessation was offered, including:  [x]  Medications  [x]  Counseling  [x]  Printed Information on Smoking Cessation  []  Referral to a Smoking Cessation Program    Patient was counseled regarding smoking for 3-10 minutes.      Significant Labs:   Recent Lab Results       12/02/18  0437   12/01/18  1703        Albumin 3.1 4.0     Alkaline Phosphatase 75 92     ALT 14 17     Anion Gap 12 15      10       AST 24 27     Baso # 0.04 0.03     Basophil% 0.4 0.3     Total Bilirubin 0.6  Comment:  For infants and newborns, interpretation of results should be based  on  gestational age, weight and in agreement with clinical  observations.  Premature Infant recommended reference ranges:  Up to 24 hours.............<8.0 mg/dL  Up to 48 hours............<12.0 mg/dL  3-5 days..................<15.0 mg/dL  6-29 days.................<15.0 mg/dL   0.7  Comment:  For infants and newborns, interpretation of results should be based  on gestational age, weight and in agreement with clinical  observations.  Premature Infant recommended reference ranges:  Up to 24 hours.............<8.0 mg/dL  Up to 48 hours............<12.0 mg/dL  3-5 days..................<15.0 mg/dL  6-29 days.................<15.0 mg/dL       BUN, Bld 14 16      13       Calcium 9.2 10.7      9.3       Chloride 102 101      101       CO2 23 20      25       Creatinine 0.8 0.9      0.8       Differential Method Automated Automated     eGFR if  >60 >60      >60       eGFR if non  >60  Comment:  Calculation used to obtain the estimated glomerular filtration  rate (eGFR) is the CKD-EPI equation.    >60  Comment:  Calculation used to obtain the estimated glomerular filtration  rate (eGFR) is the CKD-EPI equation.         >60  Comment:  Calculation used to obtain the estimated glomerular filtration  rate (eGFR) is the CKD-EPI equation.          Eos # 0.4 0.4     Eosinophil% 4.1 3.2     Glucose 86 102      86       Gran # (ANC) 5.0 7.0     Gran% 54.0 63.9     Hematocrit 42.5 48.6     Hemoglobin 14.4 17.6     Lymph # 2.8 2.3     Lymph% 30.1 20.7     MCH 33.7 35.1     MCHC 33.9 36.2      97     Mono # 1.1 1.3     Mono% 11.4 11.9     MPV 11.0 10.8     Platelet Estimate   Appears normal     Platelets 286 329     Potassium 3.4 3.7      3.4       Total Protein 6.5 8.6     RBC 4.27 5.01     RDW 11.8 12.6     Sodium 137 136      136       Troponin I   <0.006  Comment:  The reference interval for Troponin I represents the 99th percentile   cutoff   for our facility and is consistent with 3rd  generation assay   performance.       WBC 9.23 10.88         All pertinent labs within the past 24 hours have been reviewed.    Significant Imaging: I have reviewed all pertinent imaging results/findings within the past 24 hours.

## 2018-12-02 NOTE — PLAN OF CARE
Problem: Patient Care Overview  Goal: Plan of Care Review  Outcome: Ongoing (interventions implemented as appropriate)  Fall precautions maintained. Pt free from falls/injuries. Pt has frequent c/o pain. Pain moderately controlled with PRN medication. Chest tube to right side to continuous wall suction. No leaks present. Dressing clean, dry, and intact. Regular diet maintained and tolerated. Fluid promotion with PO fluids and IV fluids. Patient turns every 2 hours. POC and meds reviewed. Patient verbalized understanding. Side rails up x2. Bed Low and locked. Personal items and call light within reach. No signs/symptoms of acute distress. Chart check done. Will monitor

## 2018-12-02 NOTE — SUBJECTIVE & OBJECTIVE
No current facility-administered medications on file prior to encounter.      No current outpatient medications on file prior to encounter.       Review of patient's allergies indicates:   Allergen Reactions    Pcn [penicillins]        History reviewed. No pertinent past medical history.  Past Surgical History:   Procedure Laterality Date    HERNIA REPAIR       Family History     None        Tobacco Use    Smoking status: Current Every Day Smoker     Packs/day: 0.50     Types: Cigarettes   Substance and Sexual Activity    Alcohol use: No    Drug use: No    Sexual activity: Not on file     Review of Systems   Constitutional: Negative for fever.   HENT: Negative for congestion.    Eyes: Negative for visual disturbance.   Respiratory: Positive for shortness of breath. Negative for cough and wheezing.    Cardiovascular: Positive for chest pain (Right-sided).   Gastrointestinal: Negative for abdominal pain.   Genitourinary: Negative for difficulty urinating.   Skin: Negative for rash.   Neurological: Negative for weakness.     Objective:     Vital Signs (Most Recent):  Temp: 97.7 °F (36.5 °C) (12/02/18 0756)  Pulse: (!) 54 (12/02/18 0905)  Resp: 16 (12/02/18 0756)  BP: (!) 145/77 (12/02/18 0756)  SpO2: 96 % (12/02/18 0756) Vital Signs (24h Range):  Temp:  [97.6 °F (36.4 °C)-98.6 °F (37 °C)] 97.7 °F (36.5 °C)  Pulse:  [] 54  Resp:  [12-27] 16  SpO2:  [96 %-100 %] 96 %  BP: (129-196)/() 145/77     Weight: 54.2 kg (119 lb 9.6 oz)  Body mass index is 19.3 kg/m².    Physical Exam   Constitutional: He is oriented to person, place, and time. He appears well-developed and well-nourished. No distress.   HENT:   Head: Normocephalic and atraumatic.   Eyes: EOM are normal.   Neck: Neck supple.   Cardiovascular: Normal rate and regular rhythm.   Pulmonary/Chest: Effort normal and breath sounds normal. No respiratory distress. He has no wheezes. He exhibits tenderness (Right-sided).   Chest tube in place  To wall  suction  No air leak  No subcutaneous emphysema   Abdominal: Soft.   Musculoskeletal: Normal range of motion.   Neurological: He is alert and oriented to person, place, and time.   Skin: Skin is warm.   Vitals reviewed.      Significant Labs:  CBC:   Recent Labs   Lab 12/02/18 0437   WBC 9.23   RBC 4.27*   HGB 14.4   HCT 42.5      *   MCH 33.7*   MCHC 33.9     BMP:   Recent Labs   Lab 12/02/18 0437   GLU 86  86     136   K 3.4*  3.4*     101   CO2 23  25   BUN 14  13   CREATININE 0.8  0.8   CALCIUM 9.2  9.3       Significant Diagnostics:  I have reviewed all pertinent imaging results/findings within the past 24 hours.   AM x-ray with right-sided chest tube in place, no PTX

## 2018-12-02 NOTE — SUBJECTIVE & OBJECTIVE
History reviewed. No pertinent past medical history.    Past Surgical History:   Procedure Laterality Date    HERNIA REPAIR         Review of patient's allergies indicates:   Allergen Reactions    Pcn [penicillins]        No current facility-administered medications on file prior to encounter.      No current outpatient medications on file prior to encounter.     Family History     None        Tobacco Use    Smoking status: Current Every Day Smoker     Packs/day: 0.50     Types: Cigarettes   Substance and Sexual Activity    Alcohol use: No    Drug use: No    Sexual activity: Not on file     Review of Systems   Constitutional: Positive for activity change.   Eyes: Negative.    Respiratory: Negative for apnea, cough, choking, chest tightness, shortness of breath, wheezing and stridor.    Cardiovascular: Negative for chest pain, palpitations and leg swelling.   Gastrointestinal: Negative for abdominal pain, diarrhea, nausea and vomiting.   Endocrine: Negative.    Genitourinary: Negative for decreased urine volume, difficulty urinating, dysuria, flank pain, frequency and urgency.   Musculoskeletal:        C/o right side rib pain   Skin: Negative.    Allergic/Immunologic: Negative.    Neurological: Negative for dizziness, tremors, seizures, facial asymmetry, speech difficulty, weakness, numbness and headaches.   Hematological: Negative.    Psychiatric/Behavioral: Negative for agitation, behavioral problems and confusion. The patient is not nervous/anxious.      Objective:     Vital Signs (Most Recent):  Temp: 98 °F (36.7 °C) (12/01/18 1557)  Pulse: 74 (12/01/18 1840)  Resp: 16 (12/01/18 1840)  BP: (!) 175/101 (12/01/18 1840)  SpO2: 100 % (12/01/18 1841) Vital Signs (24h Range):  Temp:  [98 °F (36.7 °C)] 98 °F (36.7 °C)  Pulse:  [] 74  Resp:  [12-27] 16  SpO2:  [96 %-100 %] 100 %  BP: (129-196)/() 175/101     Weight: 54.2 kg (119 lb 9.6 oz)  Body mass index is 19.3 kg/m².    Physical Exam    Constitutional: He appears well-developed. He is cooperative. He is easily aroused. He appears ill. Nasal cannula in place.   HENT:   Head: Normocephalic and atraumatic.   Eyes: EOM are normal.   Neck: Normal range of motion. Neck supple.   Cardiovascular: Normal rate, regular rhythm, normal heart sounds and intact distal pulses.   No murmur heard.  Pulmonary/Chest: Effort normal. He has decreased breath sounds in the right middle field and the right lower field.   Right side chest tube   Abdominal: Soft. Bowel sounds are normal. He exhibits no distension. There is no tenderness. There is no rebound and no guarding.   Genitourinary:   Genitourinary Comments: Deferred   Musculoskeletal: He exhibits no edema or deformity.   Neurological: He is alert and easily aroused.   Skin: Skin is warm and dry. Capillary refill takes less than 2 seconds.   Psychiatric: He has a normal mood and affect. His behavior is normal. Judgment and thought content normal.         CRANIAL NERVES     CN III, IV, VI   Extraocular motions are normal.        Significant Labs:   CBC:   Recent Labs   Lab 12/01/18  1703   WBC 10.88   HGB 17.6   HCT 48.6        CMP:   Recent Labs   Lab 12/01/18  1703      K 3.7      CO2 20*      BUN 16   CREATININE 0.9   CALCIUM 10.7*   PROT 8.6*   ALBUMIN 4.0   BILITOT 0.7   ALKPHOS 92   AST 27   ALT 17   ANIONGAP 15   EGFRNONAA >60     Troponin:   Recent Labs   Lab 12/01/18  1703   TROPONINI <0.006       Significant Imaging:   Imaging Results          X-Ray Chest 1 View (Final result)  Result time 12/01/18 18:37:59    Final result by Benjamin Lobo MD (12/01/18 18:37:59)                 Impression:      See above.      Electronically signed by: Benjamin Lobo MD  Date:    12/01/2018  Time:    18:37             Narrative:    EXAMINATION:  XR CHEST 1 VIEW    CLINICAL HISTORY:  Right-sided chest tube placement.  Right-sided pneumothorax.    FINDINGS:  Right chest tube tip projects to the right  hilum.  No residual pneumothorax noted.  There is some subcutaneous emphysema.                               X-Ray Chest PA And Lateral (Final result)  Result time 12/01/18 17:21:30    Final result by Benjamin Lobo MD (12/01/18 17:21:30)                 Impression:      Right-sided pneumothorax as described above.  Multiple right-sided rib fractures.  See rib dictation.      Electronically signed by: Benjamin Lobo MD  Date:    12/01/2018  Time:    17:21             Narrative:    EXAMINATION:  XR CHEST PA AND LATERAL    CLINICAL HISTORY:  Right-sided pneumothorax    COMPARISON:  Earlier single-view chest x-ray.    FINDINGS:  Multiple right-sided rib fractures again noted.  Right-sided pneumothorax noted predominantly along the right lower lateral lung base and along the right lung base.  There is also air tracking along the medial aspect of the right lung.  No air at the right apex likely related to extensive scarring in the right upper lobe.                               X-Ray Ribs 2 View Right (Final result)  Result time 12/01/18 17:10:05    Final result by Benjamin Lobo MD (12/01/18 17:10:05)                 Impression:      Multiple right-sided rib fractures with right-sided pneumothorax.  Please see chest x-ray dictation.      Electronically signed by: Benjamin Lobo MD  Date:    12/01/2018  Time:    17:10             Narrative:    EXAMINATION:  XR RIBS 2 VIEW RIGHT    CLINICAL HISTORY:  Pleurodynia, chest pain.  Trauma, initial encounter.    COMPARISON:  Chest x-ray    FINDINGS:  There are fractures involving the posterior right 56 8 and 9th ribs.  Right-sided pneumothorax tracking down the right lateral chest wall and along the right lung base.  There is subcutaneous emphysema.                               X-Ray Chest 1 View (Final result)  Result time 12/01/18 17:08:50    Final result by Benjamin Lobo MD (12/01/18 17:08:50)                 Impression:      Multiple right-sided rib fractures with right-sided  pneumothorax.  Finding identified at 17:05 hours and discussed with Dr. Andino in the emergency room at 17:07 hours.      Electronically signed by: Benjamin Lobo MD  Date:    12/01/2018  Time:    17:08             Narrative:    EXAMINATION:  XR CHEST 1 VIEW    CLINICAL HISTORY:  Chest trauma, initial encounter    FINDINGS:  There are fractures involving the right posterior 5th 6th 8th and 9th ribs with a moderate right-sided pneumothorax tracking up the right lateral chest wall.  The heart size is normal.  No mediastinal shift.  Scarring right pulmonary apex.  The left lung is clear.

## 2018-12-02 NOTE — CONSULTS
[facility] -   General Surgery  Consult Note    Patient Name: Lucas Benral II  MRN: [id number]  Code Status: Full Code  Admission Date: 12/1/2018  Hospital Length of Stay: 1 days  Attending Physician: Pavel Prince MD  Primary Care Provider: Primary Doctor No    Patient information was obtained from patient and ER records.     Consults  Subjective:     Principal Problem: Pneumothorax    History of Present Illness: 47 yo M smoker with hepatitis-C and prior traumatic right-sided pneumothorax with associated rib fractures approximately 4 years ago presented to the ED last night status post fall from standing onto a camping chair with resultant right-sided rib fractures and pneumothorax.  He reported shortness of breath at rest after the injury. He currently complains of right-sided rib pain and pleuritic chest pain. He denies any fevers, chills, cough, or congestion.  Upon review of care every where he was admitted to [facility] in March of 2016 with a necrotizing pneumonia which required pigtail drainage of a abscess of the right upper lobe.  At that time all imaging of his lungs showed emphysematous changes.  He is still a current smoker.    No current facility-administered medications on file prior to encounter.      No current outpatient medications on file prior to encounter.       Review of patient's allergies indicates:   Allergen Reactions    Pcn [penicillins]        History reviewed. No pertinent past medical history.  Past Surgical History:   Procedure Laterality Date    HERNIA REPAIR       Family History     None        Tobacco Use    Smoking status: Current Every Day Smoker     Packs/day: 0.50     Types: Cigarettes   Substance and Sexual Activity    Alcohol use: No    Drug use: No    Sexual activity: Not on file     Review of Systems   Constitutional: Negative for fever.   HENT: Negative for congestion.    Eyes: Negative for visual disturbance.   Respiratory: Positive  for shortness of breath. Negative for cough and wheezing.    Cardiovascular: Positive for chest pain (Right-sided).   Gastrointestinal: Negative for abdominal pain.   Genitourinary: Negative for difficulty urinating.   Skin: Negative for rash.   Neurological: Negative for weakness.     Objective:     Vital Signs (Most Recent):  Temp: 97.7 °F (36.5 °C) (12/02/18 0756)  Pulse: (!) 54 (12/02/18 0905)  Resp: 16 (12/02/18 0756)  BP: (!) 145/77 (12/02/18 0756)  SpO2: 96 % (12/02/18 0756) Vital Signs (24h Range):  Temp:  [97.6 °F (36.4 °C)-98.6 °F (37 °C)] 97.7 °F (36.5 °C)  Pulse:  [] 54  Resp:  [12-27] 16  SpO2:  [96 %-100 %] 96 %  BP: (129-196)/() 145/77     Weight: 54.2 kg (119 lb 9.6 oz)  Body mass index is 19.3 kg/m².    Physical Exam   Constitutional: He is oriented to person, place, and time. He appears well-developed and well-nourished. No distress.   HENT:   Head: Normocephalic and atraumatic.   Eyes: EOM are normal.   Neck: Neck supple.   Cardiovascular: Normal rate and regular rhythm.   Pulmonary/Chest: Effort normal and breath sounds normal. No respiratory distress. He has no wheezes. He exhibits tenderness (Right-sided).   Chest tube in place  To wall suction  No air leak  No subcutaneous emphysema   Abdominal: Soft.   Musculoskeletal: Normal range of motion.   Neurological: He is alert and oriented to person, place, and time.   Skin: Skin is warm.   Vitals reviewed.      Significant Labs:  CBC:   Recent Labs   Lab 12/02/18  0437   WBC 9.23   RBC 4.27*   HGB 14.4   HCT 42.5      *   MCH 33.7*   MCHC 33.9     BMP:   Recent Labs   Lab 12/02/18  0437   GLU 86  86     136   K 3.4*  3.4*     101   CO2 23  25   BUN 14  13   CREATININE 0.8  0.8   CALCIUM 9.2  9.3       Significant Diagnostics:  I have reviewed all pertinent imaging results/findings within the past 24 hours.   AM x-ray with right-sided chest tube in place, no PTX    Assessment/Plan:     * Pneumothorax     Continued chest tube to wall suction for 24 hrs  Will place to water seal tomorrow     Tobacco abuse    Discussed with patient the importance of smoking cessation     Closed fracture of multiple ribs of right side    Pain control per primary team       VTE Risk Mitigation (From admission, onward)        Ordered     IP VTE LOW RISK PATIENT  Once      12/01/18 2244     Place MARIA hose  Until discontinued      12/01/18 2244          Thank you for your consult. I will follow-up with patient. Please contact us if you have any additional questions.    Caryn Pimentel MD  General Surgery  [facility]

## 2018-12-02 NOTE — ASSESSMENT & PLAN NOTE
- Pt reports has a hx of HTN but doesn't know the name of the medication. Pt reports he has been out for awhile. Pt reports he fills at [facility] in [place] but pharmacy is currently closed. Pt currently denies chest pain or equivalent. Will call tomorrow and obtain updated medication list  - Hydralazine PRN

## 2018-12-02 NOTE — HOSPITAL COURSE
Patient was admitted to [facility] for pneumothorax under the care of Hospital Medicine. General surgery placed R side chest tube. Patient reports he is breathing better now. CXR shows no pneumothorax. Will likely need chest tube for 24-48 additional hours. General surgery managing.     12/3/18 Chest tube to waterseal today by General Surgery. Stills has significant pain with breathing due to broken ribs.     12/4/18 Chest tube removed yesterday by General Surgery. Pain today has significantly improved. He will continue pain management for broken ribs. Counseled on alcohol abuse. OTC vitamins were prescribed. He was asked to follow up with PCP in 3 days. Patient seen and examined on date of discharge and deemed suitable.

## 2018-12-02 NOTE — H&P
"[facility] Hospital Medicine  History & Physical    Patient Name: Lucas Bernal II  MRN: [id number]  Admission Date: 12/1/2018  Attending Physician: Pavel Prince MD   Primary Care Provider: Primary Doctor No         Patient information was obtained from patient and ER records.     Subjective:     Principal Problem:Pneumothorax    Chief Complaint:   Chief Complaint   Patient presents with    Fall     pt tripped and fell yesterday, hit R chest wall on chair, pt states, "I think I broke my rib"        HPI: Lucas Bernal II is a 48 year old male with a PMHx of HTN, Tobacco abuse (1/2 PPD), and ETOH abuse (admit to drinking six pack of beer/day) who presented to the Emergency Department with c/o right side rib pain. Pt reports he tripped over his boot and fell on the edge of the table. CBC/CMP unremarkable. Troponin < 0.006. CXR showed right side pneumothorax and multiple right side rib fractures. Right rib xray with same findings. ED discussed case with General Surgery who recommended CT (16F) and admission to .     History reviewed. No pertinent past medical history.    Past Surgical History:   Procedure Laterality Date    HERNIA REPAIR         Review of patient's allergies indicates:   Allergen Reactions    Pcn [penicillins]        No current facility-administered medications on file prior to encounter.      No current outpatient medications on file prior to encounter.     Family History     None        Tobacco Use    Smoking status: Current Every Day Smoker     Packs/day: 0.50     Types: Cigarettes   Substance and Sexual Activity    Alcohol use: No    Drug use: No    Sexual activity: Not on file     Review of Systems   Constitutional: Positive for activity change.   Eyes: Negative.    Respiratory: Negative for apnea, cough, choking, chest tightness, shortness of breath, wheezing and stridor.    Cardiovascular: Negative for chest pain, palpitations and leg swelling. "   Gastrointestinal: Negative for abdominal pain, diarrhea, nausea and vomiting.   Endocrine: Negative.    Genitourinary: Negative for decreased urine volume, difficulty urinating, dysuria, flank pain, frequency and urgency.   Musculoskeletal:        C/o right side rib pain   Skin: Negative.    Allergic/Immunologic: Negative.    Neurological: Negative for dizziness, tremors, seizures, facial asymmetry, speech difficulty, weakness, numbness and headaches.   Hematological: Negative.    Psychiatric/Behavioral: Negative for agitation, behavioral problems and confusion. The patient is not nervous/anxious.      Objective:     Vital Signs (Most Recent):  Temp: 98 °F (36.7 °C) (12/01/18 1557)  Pulse: 74 (12/01/18 1840)  Resp: 16 (12/01/18 1840)  BP: (!) 175/101 (12/01/18 1840)  SpO2: 100 % (12/01/18 1841) Vital Signs (24h Range):  Temp:  [98 °F (36.7 °C)] 98 °F (36.7 °C)  Pulse:  [] 74  Resp:  [12-27] 16  SpO2:  [96 %-100 %] 100 %  BP: (129-196)/() 175/101     Weight: 54.2 kg (119 lb 9.6 oz)  Body mass index is 19.3 kg/m².    Physical Exam   Constitutional: He appears well-developed. He is cooperative. He is easily aroused. He appears ill. Nasal cannula in place.   HENT:   Head: Normocephalic and atraumatic.   Eyes: EOM are normal.   Neck: Normal range of motion. Neck supple.   Cardiovascular: Normal rate, regular rhythm, normal heart sounds and intact distal pulses.   No murmur heard.  Pulmonary/Chest: Effort normal. He has decreased breath sounds in the right middle field and the right lower field.   Right side chest tube   Abdominal: Soft. Bowel sounds are normal. He exhibits no distension. There is no tenderness. There is no rebound and no guarding.   Genitourinary:   Genitourinary Comments: Deferred   Musculoskeletal: He exhibits no edema or deformity.   Neurological: He is alert and easily aroused.   Skin: Skin is warm and dry. Capillary refill takes less than 2 seconds.   Psychiatric: He has a normal mood  and affect. His behavior is normal. Judgment and thought content normal.         CRANIAL NERVES     CN III, IV, VI   Extraocular motions are normal.        Significant Labs:   CBC:   Recent Labs   Lab 12/01/18  1703   WBC 10.88   HGB 17.6   HCT 48.6        CMP:   Recent Labs   Lab 12/01/18  1703      K 3.7      CO2 20*      BUN 16   CREATININE 0.9   CALCIUM 10.7*   PROT 8.6*   ALBUMIN 4.0   BILITOT 0.7   ALKPHOS 92   AST 27   ALT 17   ANIONGAP 15   EGFRNONAA >60     Troponin:   Recent Labs   Lab 12/01/18  1703   TROPONINI <0.006       Significant Imaging:   Imaging Results          X-Ray Chest 1 View (Final result)  Result time 12/01/18 18:37:59    Final result by Benjamin Lobo MD (12/01/18 18:37:59)                 Impression:      See above.      Electronically signed by: Benjamin Lobo MD  Date:    12/01/2018  Time:    18:37             Narrative:    EXAMINATION:  XR CHEST 1 VIEW    CLINICAL HISTORY:  Right-sided chest tube placement.  Right-sided pneumothorax.    FINDINGS:  Right chest tube tip projects to the right hilum.  No residual pneumothorax noted.  There is some subcutaneous emphysema.                               X-Ray Chest PA And Lateral (Final result)  Result time 12/01/18 17:21:30    Final result by Benjamin Lobo MD (12/01/18 17:21:30)                 Impression:      Right-sided pneumothorax as described above.  Multiple right-sided rib fractures.  See rib dictation.      Electronically signed by: Benjamin Lobo MD  Date:    12/01/2018  Time:    17:21             Narrative:    EXAMINATION:  XR CHEST PA AND LATERAL    CLINICAL HISTORY:  Right-sided pneumothorax    COMPARISON:  Earlier single-view chest x-ray.    FINDINGS:  Multiple right-sided rib fractures again noted.  Right-sided pneumothorax noted predominantly along the right lower lateral lung base and along the right lung base.  There is also air tracking along the medial aspect of the right lung.  No air at the right  apex likely related to extensive scarring in the right upper lobe.                               X-Ray Ribs 2 View Right (Final result)  Result time 12/01/18 17:10:05    Final result by Benjamin Lobo MD (12/01/18 17:10:05)                 Impression:      Multiple right-sided rib fractures with right-sided pneumothorax.  Please see chest x-ray dictation.      Electronically signed by: Benjamin Lobo MD  Date:    12/01/2018  Time:    17:10             Narrative:    EXAMINATION:  XR RIBS 2 VIEW RIGHT    CLINICAL HISTORY:  Pleurodynia, chest pain.  Trauma, initial encounter.    COMPARISON:  Chest x-ray    FINDINGS:  There are fractures involving the posterior right 56 8 and 9th ribs.  Right-sided pneumothorax tracking down the right lateral chest wall and along the right lung base.  There is subcutaneous emphysema.                               X-Ray Chest 1 View (Final result)  Result time 12/01/18 17:08:50    Final result by Benjamin Lobo MD (12/01/18 17:08:50)                 Impression:      Multiple right-sided rib fractures with right-sided pneumothorax.  Finding identified at 17:05 hours and discussed with Dr. Andino in the emergency room at 17:07 hours.      Electronically signed by: Benjamin Lobo MD  Date:    12/01/2018  Time:    17:08             Narrative:    EXAMINATION:  XR CHEST 1 VIEW    CLINICAL HISTORY:  Chest trauma, initial encounter    FINDINGS:  There are fractures involving the right posterior 5th 6th 8th and 9th ribs with a moderate right-sided pneumothorax tracking up the right lateral chest wall.  The heart size is normal.  No mediastinal shift.  Scarring right pulmonary apex.  The left lung is clear.                              Assessment/Plan:     * Pneumothorax    - Admit to [facility]  - General surgery following  - ED placed right side chest tube for pneumothorax  - Repeat CXR in AM  - Supplemental oxygen prn, keep O2 sats > 92%  - Analgesics PRN       Closed fracture of multiple ribs of right  side    - Analgesics PRN       Essential hypertension    - Pt reports has a hx of HTN but doesn't know the name of the medication. Pt reports he has been out for awhile. Pt reports he fills at [facility] in [place] but pharmacy is currently closed. Pt currently denies chest pain or equivalent. Will call tomorrow and obtain updated medication list  - Hydralazine PRN       Tobacco abuse    - Pt educated on smoking cessation for about 3-10 minutes, pt verbalized understanding. Pt declined nicotine patch at this time.       ETOH abuse    - Pt reports drinking a least six pack of beer/day. Monitor for withdrawals. Librium PRN         VTE Risk Mitigation (From admission, onward)    Sequence Compression Device             GAGANDEEP Doan  Department of Hospital Medicine   [facility]

## 2018-12-02 NOTE — HPI
Lucas Bernal II is a 48 year old male with a PMHx of HTN, Tobacco abuse (1/2 PPD), and ETOH abuse (admit to drinking six pack of beer/day) who presented to the Emergency Department with c/o right side rib pain. Pt reports he tripped over his boot and fell on the edge of the table. CBC/CMP unremarkable. Troponin < 0.006. CXR showed right side pneumothorax and multiple right side rib fractures. Right rib xray with same findings. ED discussed case with General Surgery who recommended CT (16F) and admission to .

## 2018-12-03 LAB
ANION GAP SERPL CALC-SCNC: 9 MMOL/L
BASOPHILS # BLD AUTO: 0.02 K/UL
BASOPHILS NFR BLD: 0.2 %
BUN SERPL-MCNC: 8 MG/DL
CALCIUM SERPL-MCNC: 9.3 MG/DL
CHLORIDE SERPL-SCNC: 105 MMOL/L
CO2 SERPL-SCNC: 22 MMOL/L
CREAT SERPL-MCNC: 0.7 MG/DL
DIFFERENTIAL METHOD: ABNORMAL
EOSINOPHIL # BLD AUTO: 0.3 K/UL
EOSINOPHIL NFR BLD: 3.3 %
ERYTHROCYTE [DISTWIDTH] IN BLOOD BY AUTOMATED COUNT: 12.4 %
EST. GFR  (AFRICAN AMERICAN): >60 ML/MIN/1.73 M^2
EST. GFR  (NON AFRICAN AMERICAN): >60 ML/MIN/1.73 M^2
GLUCOSE SERPL-MCNC: 110 MG/DL
HCT VFR BLD AUTO: 41.8 %
HGB BLD-MCNC: 14.5 G/DL
LYMPHOCYTES # BLD AUTO: 1.9 K/UL
LYMPHOCYTES NFR BLD: 20.7 %
MCH RBC QN AUTO: 34.1 PG
MCHC RBC AUTO-ENTMCNC: 34.7 G/DL
MCV RBC AUTO: 98 FL
MONOCYTES # BLD AUTO: 1.1 K/UL
MONOCYTES NFR BLD: 11.5 %
NEUTROPHILS # BLD AUTO: 5.9 K/UL
NEUTROPHILS NFR BLD: 64.5 %
PLATELET # BLD AUTO: 272 K/UL
PMV BLD AUTO: 10.6 FL
POTASSIUM SERPL-SCNC: 4 MMOL/L
RBC # BLD AUTO: 4.25 M/UL
SODIUM SERPL-SCNC: 136 MMOL/L
WBC # BLD AUTO: 9.22 K/UL

## 2018-12-03 PROCEDURE — 80048 BASIC METABOLIC PNL TOTAL CA: CPT

## 2018-12-03 PROCEDURE — G8979 MOBILITY GOAL STATUS: HCPCS | Mod: CH

## 2018-12-03 PROCEDURE — S4991 NICOTINE PATCH NONLEGEND: HCPCS | Performed by: NURSE PRACTITIONER

## 2018-12-03 PROCEDURE — 97161 PT EVAL LOW COMPLEX 20 MIN: CPT

## 2018-12-03 PROCEDURE — 36415 COLL VENOUS BLD VENIPUNCTURE: CPT

## 2018-12-03 PROCEDURE — 85025 COMPLETE CBC W/AUTO DIFF WBC: CPT

## 2018-12-03 PROCEDURE — 63600175 PHARM REV CODE 636 W HCPCS: Performed by: NURSE PRACTITIONER

## 2018-12-03 PROCEDURE — 25000003 PHARM REV CODE 250: Performed by: NURSE PRACTITIONER

## 2018-12-03 PROCEDURE — 21400001 HC TELEMETRY ROOM

## 2018-12-03 PROCEDURE — G8978 MOBILITY CURRENT STATUS: HCPCS | Mod: CH

## 2018-12-03 PROCEDURE — 97116 GAIT TRAINING THERAPY: CPT

## 2018-12-03 RX ORDER — AMLODIPINE BESYLATE 5 MG/1
5 TABLET ORAL DAILY
Status: DISCONTINUED | OUTPATIENT
Start: 2018-12-04 | End: 2018-12-04 | Stop reason: HOSPADM

## 2018-12-03 RX ADMIN — Medication 1 TABLET: at 09:12

## 2018-12-03 RX ADMIN — LISINOPRIL 10 MG: 10 TABLET ORAL at 09:12

## 2018-12-03 RX ADMIN — CHLORDIAZEPOXIDE HYDROCHLORIDE 10 MG: 10 CAPSULE ORAL at 09:12

## 2018-12-03 RX ADMIN — MORPHINE SULFATE 2 MG: 2 INJECTION, SOLUTION INTRAMUSCULAR; INTRAVENOUS at 07:12

## 2018-12-03 RX ADMIN — Medication 100 MG: at 09:12

## 2018-12-03 RX ADMIN — THERA TABS 1 TABLET: TAB at 09:12

## 2018-12-03 RX ADMIN — MORPHINE SULFATE 2 MG: 2 INJECTION, SOLUTION INTRAMUSCULAR; INTRAVENOUS at 12:12

## 2018-12-03 RX ADMIN — CHLORDIAZEPOXIDE HYDROCHLORIDE 10 MG: 10 CAPSULE ORAL at 02:12

## 2018-12-03 RX ADMIN — HYDROCODONE BITARTRATE AND ACETAMINOPHEN 1 TABLET: 10; 325 TABLET ORAL at 04:12

## 2018-12-03 RX ADMIN — NICOTINE 1 PATCH: 21 PATCH, EXTENDED RELEASE TRANSDERMAL at 09:12

## 2018-12-03 RX ADMIN — MORPHINE SULFATE 2 MG: 2 INJECTION, SOLUTION INTRAMUSCULAR; INTRAVENOUS at 03:12

## 2018-12-03 RX ADMIN — MORPHINE SULFATE 2 MG: 2 INJECTION, SOLUTION INTRAMUSCULAR; INTRAVENOUS at 11:12

## 2018-12-03 NOTE — PLAN OF CARE
Problem: Patient Care Overview  Goal: Plan of Care Review  Outcome: Ongoing (interventions implemented as appropriate)  Plan of care reviewed with patient; verbalized understanding. Fall precautions maintained, patient remains free from injury. Pain being managed appropriately. Medications being administered as ordered. Chest tube in place to right lateral chest; dressing clean, dry and intact. Vital signs stable with no signs of distress noted. Bed low and locked with call light in reach. Will continue monitor.

## 2018-12-03 NOTE — ASSESSMENT & PLAN NOTE
- Admit to [facility]  - General surgery following  - ED placed right side chest tube for pneumothorax  - Repeat CXR in AM  - Supplemental oxygen prn, keep O2 sats > 92%  - Analgesics PRN  12/2:  --Chest tube managed by gen surgery  --CXR shows no pneumothorax   --supplemental oxygen  --PRN analgesia  --will need chest tube 24-48 hours more, then clamp, then remove if lung stays inflated  --general surgery following  12/3/18  -Chest tube to waterseal today by General Surgery  -repeat CXR today at 1200.  -supplemental oxygen prn

## 2018-12-03 NOTE — ASSESSMENT & PLAN NOTE
- Pt reports drinking a least six pack of beer/day. Monitor for withdrawals. Librium PRN  12/2:  --bananna bag today   --MVI, thiamine & Folbic daily starting tomorrow  --librium tid  --prn ativan for seizure/withdrawals  --monitor  12/3/18  Plan as above

## 2018-12-03 NOTE — PROGRESS NOTES
Pt chest tube removed. Pt tolerated well with no complaints post pull.     Will XR in 4 hours to check for recurrence.     General Surgery will sign off. Please call with questions or concerns.

## 2018-12-03 NOTE — ASSESSMENT & PLAN NOTE
Chest tube placed to water seal this morning  Will obtain a chest x-ray at noon  Continue supportive care

## 2018-12-03 NOTE — PROGRESS NOTES
[facility] Hospital Medicine  Progress Note    Patient Name: Lucas Bernal II  MRN: [id number]  Patient Class: IP- Inpatient   Admission Date: 12/1/2018  Length of Stay: 2 days  Attending Physician: Pavel Prince MD  Primary Care Provider: Primary Doctor No    Subjective:     Principal Problem:Pneumothorax    HPI:  Lucas Bernal II is a 48 year old male with a PMHx of HTN, Tobacco abuse (1/2 PPD), and ETOH abuse (admit to drinking six pack of beer/day) who presented to the Emergency Department with c/o right side rib pain. Pt reports he tripped over his boot and fell on the edge of the table. CBC/CMP unremarkable. Troponin < 0.006. CXR showed right side pneumothorax and multiple right side rib fractures. Right rib xray with same findings. ED discussed case with General Surgery who recommended CT (16F) and admission to .     Hospital Course:  Patient was admitted to [facility] for pneumothorax under the care of Hospital Medicine. General surgery placed R side chest tube. Patient reports he is breathing better now. CXR shows no pneumothorax. Will likely need chest tube for 24-48 additional hours. General surgery managing.     12/3/18 Chest tube to waterseal today by General Surgery. Stills has significant pain with breathing due to broken ribs.     Interval History: still has some dyspnea. Has some pain with deep breaths due to rib pain.    Review of Systems   Constitutional: Negative for fever.   HENT: Negative for congestion.    Eyes: Negative for visual disturbance.   Respiratory: Positive for shortness of breath. Negative for cough and wheezing.    Cardiovascular: Positive for chest pain (Right-sided).   Gastrointestinal: Negative for abdominal pain.   Genitourinary: Negative for difficulty urinating.   Skin: Negative for rash.   Neurological: Negative for weakness.     Objective:     Vital Signs (Most Recent):  Temp: 98 °F (36.7 °C) (12/03/18 0750)  Pulse: 70 (12/03/18 0750)  Resp: 20  (12/03/18 0750)  BP: 113/69 (12/03/18 0750)  SpO2: 98 % (12/03/18 0750) Vital Signs (24h Range):  Temp:  [97.5 °F (36.4 °C)-98.4 °F (36.9 °C)] 98 °F (36.7 °C)  Pulse:  [50-77] 70  Resp:  [18-20] 20  SpO2:  [97 %-98 %] 98 %  BP: (108-142)/(64-84) 113/69     Weight: 54.2 kg (119 lb 9.6 oz)  Body mass index is 19.3 kg/m².    Intake/Output Summary (Last 24 hours) at 12/3/2018 1158  Last data filed at 12/3/2018 1003  Gross per 24 hour   Intake 1440 ml   Output 2063 ml   Net -623 ml      Physical Exam   Constitutional: He is oriented to person, place, and time. He appears well-developed and well-nourished. He is cooperative. He is easily aroused. He appears ill. No distress. Nasal cannula in place.   Thin, chronically ill     HENT:   Head: Normocephalic and atraumatic.   Nose: Nose normal.   Eyes: Conjunctivae and EOM are normal. No scleral icterus.   Neck: Normal range of motion. Neck supple. No JVD present.   Cardiovascular: Normal rate, regular rhythm, normal heart sounds and intact distal pulses.   No murmur heard.  Pulmonary/Chest: Effort normal. No respiratory distress. He has decreased breath sounds in the right middle field and the right lower field. He has wheezes. He has rales.   Right side chest tube   Abdominal: Soft. Bowel sounds are normal. He exhibits no distension. There is no tenderness. There is no rebound and no guarding.   Genitourinary:   Genitourinary Comments: Deferred   Musculoskeletal: He exhibits no edema or deformity.   Neurological: He is alert, oriented to person, place, and time and easily aroused. No cranial nerve deficit. Coordination normal.   Skin: Skin is warm and dry. Capillary refill takes less than 2 seconds. He is not diaphoretic.   Psychiatric: He has a normal mood and affect. His behavior is normal. Judgment and thought content normal.     Significant Labs:   CBC:   Recent Labs   Lab 12/01/18  1703 12/02/18  0437 12/03/18  0458   WBC 10.88 9.23 9.22   HGB 17.6 14.4 14.5   HCT 48.6  42.5 41.8    286 272     CMP:   Recent Labs   Lab 12/01/18  1703 12/02/18  0437 12/03/18  0458    137  136 136   K 3.7 3.4*  3.4* 4.0    102  101 105   CO2 20* 23  25 22*    86  86 110   BUN 16 14  13 8   CREATININE 0.9 0.8  0.8 0.7   CALCIUM 10.7* 9.2  9.3 9.3   PROT 8.6* 6.5  --    ALBUMIN 4.0 3.1*  --    BILITOT 0.7 0.6  --    ALKPHOS 92 75  --    AST 27 24  --    ALT 17 14  --    ANIONGAP 15 12  10 9   EGFRNONAA >60 >60  >60 >60       Significant Imaging:    X-Ray Chest AP Portable [[id number]] Resulted: 12/03/18 0943   Order Status: Completed Updated: 12/03/18 0946   Narrative:     EXAMINATION:  XR CHEST AP PORTABLE    CLINICAL HISTORY:  Evaluate pneumothorax;    TECHNIQUE:  Single frontal view of the chest was performed.    COMPARISON:  12/03/2018    FINDINGS:  Right-sided pleural drain.  No definite pneumothorax.  Apical interstitial opacities and apical pleural scarring are unchanged.  Heart size is normal.  Aorta demonstrates atherosclerotic disease.  Mild degenerative changes of the bones.  Chronic deformity right clavicle.    In comparison to the prior study, there is no adverse interval changes.   Impression:       In comparison to the prior study, there is no adverse interval changes         Assessment/Plan:      * Pneumothorax    - Admit to Med-Tele  - General surgery following  - ED placed right side chest tube for pneumothorax  - Repeat CXR in AM  - Supplemental oxygen prn, keep O2 sats > 92%  - Analgesics PRN  12/2:  --Chest tube managed by gen surgery  --CXR shows no pneumothorax   --supplemental oxygen  --PRN analgesia  --will need chest tube 24-48 hours more, then clamp, then remove if lung stays inflated  --general surgery following  12/3/18  -Chest tube to waterseal today by General Surgery  -repeat CXR today at 1200.  -supplemental oxygen prn          ETOH abuse    - Pt reports drinking a least six pack of beer/day. Monitor for withdrawals. Librium  PRN  12/2:  --bananna bag today   --MVI, thiamine & Folbic daily starting tomorrow  --librium tid  --prn ativan for seizure/withdrawals  --monitor  12/3/18  Plan as above       Tobacco abuse    --cessation counseling  --nicotine patch       Essential hypertension    Patient's home dose is amlodipine 5mg daily  Will discontinue lisinopril and order home medication  Blood pressure is controlled today       Closed fracture of multiple ribs of right side    - Analgesics PRN  -encourage deep breaths             VTE Risk Mitigation (From admission, onward)        Ordered     IP VTE LOW RISK PATIENT  Once      12/01/18 2244     Place MARIA hose  Until discontinued      12/01/18 2244        Josie Flores NP  Department of Hospital Medicine   [facility]

## 2018-12-03 NOTE — PT/OT/SLP EVAL
Physical Therapy Evaluation    Patient Name:  Lucas Bernal II   MRN:  [id number]    Recommendations:     Discharge Recommendations:  home   Discharge Equipment Recommendations: none   Barriers to discharge: None    Assessment:     Lucas Bernal II is a 48 y.o. male admitted with a medical diagnosis of Pneumothorax.  He presents with the following impairments/functional limitations:    NONE.    Rehab Prognosis:  N/A; patient would benefit from acute skilled PT services to address these deficits and reach maximum level of function.      Recent Surgery: * No surgery found *      Plan:     During this hospitalization, patient to be seen (EVAL/GAIT ONLY) (SKILLED P.T. INTERVENTION NOT INDICATED AT PRESENT)  · Plan of Care Expires:  12/03/18   Plan of Care Reviewed with:  PATIENT    Subjective     Communicated with Epic AND NURSE FREED prior to session.  Patient found IN SUPINE upon PT entry to room, agreeable to evaluation.      Chief Complaint: PAIN AND WANTS TO GO OUTSIDE  Patient comments/goals: GET OUT OF HERE  Pain/Comfort:  · Pain Rating 1: 6/10  · Location - Side 1: Right  · Location 1: chest  · Pain Addressed 1: Distraction, Reposition    Patients cultural, spiritual, [employer] conflicts given the current situation:      Living Environment:  LIVES ALONE IN A MOBILE HOME, 3 STEPS TO ENTER AND RAIL ON LEFT SIDE  Prior to admission, patients level of function was INDEPENDENT.  Patient has the following equipment: none.  DME owned (not currently used): none.  Upon discharge, patient will have assistance from N/A.    Objective:     Patient found with: chest tube, peripheral IV     General Precautions: Standard,  Orthopedic Precautions:N/A   Braces: N/A     Exams:  · Cognitive Exam:  Patient is oriented to Person, Place, Time and Situation  · RLE ROM: WFL  · RLE Strength: WFL  · LLE ROM: WFL  · LLE Strength: WFL    Functional Mobility:  · Bed Mobility:     · Supine to Sit: modified  independence  · Transfers:     · Sit to Stand:  independence with no AD  · Gait: AMBULATED 2 X 75 FEET IN HALLWAY, QUICK PACE, IRATE, NO LOB    AM-PAC 6 CLICK MOBILITY  Total Score:21       Therapeutic Activities and Exercises:   AMBULATED IN HALLWAY WITH P.T. HOLDING ONTO CHEST TUBE,  PT AGITATED AND IRATE, WANTS TO GO OUTSIDE.  PT EDUCATION RE- SAFETY AND CARE WITH CHEST TUBE.  PT HOLDING TUBING FOLDED AND PULLING ON TUBE ATTACHED TO CANNISTER TAPED TO FLOOR.  EDUCATED ON WEARING NON SKID SOCKS FOR SAFETY WHILE WALKING BUT PT REFUSED TO DO SO.    Patient left SITTING ON SIDE OF BED with all lines intact, call button in reach, NURSE notified and FAMILY MEMBER  present.    GOALS:   Multidisciplinary Problems     Physical Therapy Goals     Not on file                History:     History reviewed. No pertinent past medical history.    Past Surgical History:   Procedure Laterality Date    HERNIA REPAIR         Clinical Decision Making:     History  Co-morbidities and personal factors that may impact the plan of care Examination  Body Structures and Functions, activity limitations and participation restrictions that may impact the plan of care Clinical Presentation   Decision Making/ Complexity Score   Co-morbidities:   [] Time since onset of injury / illness / exacerbation  [] Status of current condition  []Patient's cognitive status and safety concerns    [] Multiple Medical Problems (see med hx)  Personal Factors:   [] Patient's age  [] Prior Level of function   [] Patient's home situation (environment and family support)  [] Patient's level of motivation  [] Expected progression of patient      HISTORY:(criteria)    [] 02528 - no personal factors/history    [] 62660 - has 1-2 personal factor/comorbidity     [] 04331 - has >3 personal factor/comorbidity     Body Regions:  [] Objective examination findings  [] Head     []  Neck  [] Trunk   [] Upper Extremity  [] Lower Extremity    Body Systems:  [] For communication  ability, affect, cognition, language, and learning style: the assessment of the ability to make needs known, consciousness, orientation (person, place, and time), expected emotional /behavioral responses, and learning preferences (eg, learning barriers, education  needs)  [] For the neuromuscular system: a general assessment of gross coordinated movement (eg, balance, gait, locomotion, transfers, and transitions) and motor function  (motor control and motor learning)  [] For the musculoskeletal system: the assessment of gross symmetry, gross range of motion, gross strength, height, and weight  [] For the integumentary system: the assessment of pliability(texture), presence of scar formation, skin color, and skin integrity  [] For cardiovascular/pulmonary system: the assessment of heart rate, respiratory rate, blood pressure, and edema     Activity limitations:    [] Patient's cognitive status and saf ety concerns          [] Status of current condition      [] Weight bearing restriction  [] Cardiopulmunary Restriction    Participation Restrictions:   [] Goals and goal agreement with the patient     [] Rehab potential (prognosis) and probable outcome      Examination of Body System: (criteria)    [] 53187 - addressing 1-2 elements    [] 87127 - addressing a total of 3 or more elements     [] 71284 -  Addressing a total of 4 or more elements         Clinical Presentation: (criteria)  Choose one     On examination of body system using standardized tests and measures patient presents with (CHOOSE ONE) elements from any of the following: body structures and functions, activity limitations, and/or participation restrictions.  Leading to a clinical presentation that is considered (CHOOSE ONE)                              Clinical Decision Making  (Eval Complexity):  Choose One     Time Tracking:     PT Received On: 12/03/18  PT Start Time: 0945     PT Stop Time: 1015  PT Total Time (min): 30 min     Billable Minutes:  Evaluation 15 and Gait Training 15      Shellie Costa, PT  12/03/2018

## 2018-12-03 NOTE — SUBJECTIVE & OBJECTIVE
Interval History: Doing well. No hemoptysis.  Pain controlled.  Minimal mobilization.     Medications:  Continuous Infusions:  Scheduled Meds:   chlordiazepoxide  10 mg Oral TID    folic acid-vit B6-vit B12 2.5-25-2 mg  1 tablet Oral Daily    lisinopril  10 mg Oral Daily    multivitamin  1 tablet Oral Daily    nicotine  1 patch Transdermal Daily    thiamine  100 mg Oral Daily     PRN Meds:acetaminophen, hydrALAZINE, HYDROcodone-acetaminophen, LORazepam, morphine, ondansetron, promethazine (PHENERGAN) IVPB     Review of patient's allergies indicates:   Allergen Reactions    Pcn [penicillins]      Objective:     Vital Signs (Most Recent):  Temp: 97.7 °F (36.5 °C) (12/03/18 0432)  Pulse: 66 (12/03/18 0432)  Resp: 18 (12/03/18 0432)  BP: 108/71 (12/03/18 0432)  SpO2: 98 % (12/03/18 0432) Vital Signs (24h Range):  Temp:  [97.5 °F (36.4 °C)-98.4 °F (36.9 °C)] 97.7 °F (36.5 °C)  Pulse:  [50-77] 66  Resp:  [18-20] 18  SpO2:  [97 %-98 %] 98 %  BP: (108-142)/(64-84) 108/71     Weight: 54.2 kg (119 lb 9.6 oz)  Body mass index is 19.3 kg/m².    Intake/Output - Last 3 Shifts       12/01 0700 - 12/02 0659 12/02 0700 - 12/03 0659 12/03 0700 - 12/04 0659    P.O. 120 720 360    I.V. (mL/kg) 1 (0)      Total Intake(mL/kg) 121 (2.2) 720 (13.3) 360 (6.6)    Urine (mL/kg/hr) 300 1950 (1.5) 200 (2.2)    Chest Tube 7 13     Total Output 307 1963 200    Net -186 -1243 +160                 Physical Exam   Constitutional: He is oriented to person, place, and time. He appears well-developed and well-nourished. No distress.   HENT:   Head: Normocephalic and atraumatic.   Eyes: EOM are normal.   Neck: Neck supple.   Cardiovascular: Normal rate and regular rhythm.   Pulmonary/Chest: Effort normal and breath sounds normal. No respiratory distress.   Chest tube to suction  No air leak  No subcutaneous emphysema   Abdominal: Soft. He exhibits no distension. There is no tenderness.   Musculoskeletal: Normal range of motion.   Neurological: He  is alert and oriented to person, place, and time.   Skin: Skin is warm.   Vitals reviewed.      Significant Labs:  CBC:   Recent Labs   Lab 12/03/18  0458   WBC 9.22   RBC 4.25*   HGB 14.5   HCT 41.8      MCV 98   MCH 34.1*   MCHC 34.7     BMP:   Recent Labs   Lab 12/03/18  0458         K 4.0      CO2 22*   BUN 8   CREATININE 0.7   CALCIUM 9.3       Significant Diagnostics:  CXR: I have reviewed all pertinent results/findings within the past 24 hours and my personal findings are:  Tube in place, no pneumothorax

## 2018-12-03 NOTE — HOSPITAL COURSE
12/03/2018 - Doing well. No hemoptysis.  Pain controlled.  Minimal mobilization.  No air leak this morning.  Will place chest tube to water seal.

## 2018-12-03 NOTE — SUBJECTIVE & OBJECTIVE
Interval History: still has some dyspnea. Has some pain with deep breaths due to rib pain.    Review of Systems   Constitutional: Negative for fever.   HENT: Negative for congestion.    Eyes: Negative for visual disturbance.   Respiratory: Positive for shortness of breath. Negative for cough and wheezing.    Cardiovascular: Positive for chest pain (Right-sided).   Gastrointestinal: Negative for abdominal pain.   Genitourinary: Negative for difficulty urinating.   Skin: Negative for rash.   Neurological: Negative for weakness.     Objective:     Vital Signs (Most Recent):  Temp: 98 °F (36.7 °C) (12/03/18 0750)  Pulse: 70 (12/03/18 0750)  Resp: 20 (12/03/18 0750)  BP: 113/69 (12/03/18 0750)  SpO2: 98 % (12/03/18 0750) Vital Signs (24h Range):  Temp:  [97.5 °F (36.4 °C)-98.4 °F (36.9 °C)] 98 °F (36.7 °C)  Pulse:  [50-77] 70  Resp:  [18-20] 20  SpO2:  [97 %-98 %] 98 %  BP: (108-142)/(64-84) 113/69     Weight: 54.2 kg (119 lb 9.6 oz)  Body mass index is 19.3 kg/m².    Intake/Output Summary (Last 24 hours) at 12/3/2018 1158  Last data filed at 12/3/2018 1003  Gross per 24 hour   Intake 1440 ml   Output 2063 ml   Net -623 ml      Physical Exam   Constitutional: He is oriented to person, place, and time. He appears well-developed and well-nourished. He is cooperative. He is easily aroused. He appears ill. No distress. Nasal cannula in place.   Thin, chronically ill     HENT:   Head: Normocephalic and atraumatic.   Nose: Nose normal.   Eyes: Conjunctivae and EOM are normal. No scleral icterus.   Neck: Normal range of motion. Neck supple. No JVD present.   Cardiovascular: Normal rate, regular rhythm, normal heart sounds and intact distal pulses.   No murmur heard.  Pulmonary/Chest: Effort normal. No respiratory distress. He has decreased breath sounds in the right middle field and the right lower field. He has wheezes. He has rales.   Right side chest tube   Abdominal: Soft. Bowel sounds are normal. He exhibits no distension.  There is no tenderness. There is no rebound and no guarding.   Genitourinary:   Genitourinary Comments: Deferred   Musculoskeletal: He exhibits no edema or deformity.   Neurological: He is alert, oriented to person, place, and time and easily aroused. No cranial nerve deficit. Coordination normal.   Skin: Skin is warm and dry. Capillary refill takes less than 2 seconds. He is not diaphoretic.   Psychiatric: He has a normal mood and affect. His behavior is normal. Judgment and thought content normal.     Significant Labs:   CBC:   Recent Labs   Lab 12/01/18 1703 12/02/18 0437 12/03/18 0458   WBC 10.88 9.23 9.22   HGB 17.6 14.4 14.5   HCT 48.6 42.5 41.8    286 272     CMP:   Recent Labs   Lab 12/01/18 1703 12/02/18 0437 12/03/18 0458    137  136 136   K 3.7 3.4*  3.4* 4.0    102  101 105   CO2 20* 23  25 22*    86  86 110   BUN 16 14  13 8   CREATININE 0.9 0.8  0.8 0.7   CALCIUM 10.7* 9.2  9.3 9.3   PROT 8.6* 6.5  --    ALBUMIN 4.0 3.1*  --    BILITOT 0.7 0.6  --    ALKPHOS 92 75  --    AST 27 24  --    ALT 17 14  --    ANIONGAP 15 12  10 9   EGFRNONAA >60 >60  >60 >60       Significant Imaging:    X-Ray Chest AP Portable [[id number]] Resulted: 12/03/18 0943   Order Status: Completed Updated: 12/03/18 0946   Narrative:     EXAMINATION:  XR CHEST AP PORTABLE    CLINICAL HISTORY:  Evaluate pneumothorax;    TECHNIQUE:  Single frontal view of the chest was performed.    COMPARISON:  12/03/2018    FINDINGS:  Right-sided pleural drain.  No definite pneumothorax.  Apical interstitial opacities and apical pleural scarring are unchanged.  Heart size is normal.  Aorta demonstrates atherosclerotic disease.  Mild degenerative changes of the bones.  Chronic deformity right clavicle.    In comparison to the prior study, there is no adverse interval changes.   Impression:       In comparison to the prior study, there is no adverse interval changes

## 2018-12-03 NOTE — ASSESSMENT & PLAN NOTE
Patient's home dose is amlodipine 5mg daily  Will discontinue lisinopril and order home medication  Blood pressure is controlled today

## 2018-12-03 NOTE — PLAN OF CARE
Problem: Patient Care Overview  Goal: Plan of Care Review  Outcome: Ongoing (interventions implemented as appropriate)  No acute distress noted. IV fluids infusing. Chest tube in place. Safety measures are in place. Call bell within reach. Pain being managed. Pt free of falls. Will continue to monitor. Chart check complete.

## 2018-12-03 NOTE — PROGRESS NOTES
[facility] -   General Surgery  Progress Note    Subjective:     History of Present Illness:  47 yo M smoker with hepatitis-C and prior traumatic right-sided pneumothorax with associated rib fractures approximately 4 years ago presented to the ED last night status post fall from standing onto a camping chair with resultant right-sided rib fractures and pneumothorax.  He reported shortness of breath at rest after the injury. He currently complains of right-sided rib pain and pleuritic chest pain. He denies any fevers, chills, cough, or congestion.  Upon review of care every where he was admitted to [facility] in March of 2016 with a necrotizing pneumonia which required pigtail drainage of a abscess of the right upper lobe.  At that time all imaging of his lungs showed emphysematous changes.  He is still a current smoker.    Post-Op Info:  * No surgery found *         Interval History: Doing well. No hemoptysis.  Pain controlled.  Minimal mobilization.     Medications:  Continuous Infusions:  Scheduled Meds:   chlordiazepoxide  10 mg Oral TID    folic acid-vit B6-vit B12 2.5-25-2 mg  1 tablet Oral Daily    lisinopril  10 mg Oral Daily    multivitamin  1 tablet Oral Daily    nicotine  1 patch Transdermal Daily    thiamine  100 mg Oral Daily     PRN Meds:acetaminophen, hydrALAZINE, HYDROcodone-acetaminophen, LORazepam, morphine, ondansetron, promethazine (PHENERGAN) IVPB     Review of patient's allergies indicates:   Allergen Reactions    Pcn [penicillins]      Objective:     Vital Signs (Most Recent):  Temp: 97.7 °F (36.5 °C) (12/03/18 0432)  Pulse: 66 (12/03/18 0432)  Resp: 18 (12/03/18 0432)  BP: 108/71 (12/03/18 0432)  SpO2: 98 % (12/03/18 0432) Vital Signs (24h Range):  Temp:  [97.5 °F (36.4 °C)-98.4 °F (36.9 °C)] 97.7 °F (36.5 °C)  Pulse:  [50-77] 66  Resp:  [18-20] 18  SpO2:  [97 %-98 %] 98 %  BP: (108-142)/(64-84) 108/71     Weight: 54.2 kg (119 lb 9.6 oz)  Body mass index is 19.3  kg/m².    Intake/Output - Last 3 Shifts       12/01 0700 - 12/02 0659 12/02 0700 - 12/03 0659 12/03 0700 - 12/04 0659    P.O. 120 720 360    I.V. (mL/kg) 1 (0)      Total Intake(mL/kg) 121 (2.2) 720 (13.3) 360 (6.6)    Urine (mL/kg/hr) 300 1950 (1.5) 200 (2.2)    Chest Tube 7 13     Total Output 307 1963 200    Net -186 -1243 +160                 Physical Exam   Constitutional: He is oriented to person, place, and time. He appears well-developed and well-nourished. No distress.   HENT:   Head: Normocephalic and atraumatic.   Eyes: EOM are normal.   Neck: Neck supple.   Cardiovascular: Normal rate and regular rhythm.   Pulmonary/Chest: Effort normal and breath sounds normal. No respiratory distress.   Chest tube to suction  No air leak  No subcutaneous emphysema   Abdominal: Soft. He exhibits no distension. There is no tenderness.   Musculoskeletal: Normal range of motion.   Neurological: He is alert and oriented to person, place, and time.   Skin: Skin is warm.   Vitals reviewed.      Significant Labs:  CBC:   Recent Labs   Lab 12/03/18  0458   WBC 9.22   RBC 4.25*   HGB 14.5   HCT 41.8      MCV 98   MCH 34.1*   MCHC 34.7     BMP:   Recent Labs   Lab 12/03/18  0458         K 4.0      CO2 22*   BUN 8   CREATININE 0.7   CALCIUM 9.3       Significant Diagnostics:  CXR: I have reviewed all pertinent results/findings within the past 24 hours and my personal findings are:  Tube in place, no pneumothorax    Assessment/Plan:     * Pneumothorax    Chest tube placed to water seal this morning  Will obtain a chest x-ray at noon  Continue supportive care     Tobacco abuse    Discussed with patient the importance of smoking cessation     Closed fracture of multiple ribs of right side    Pain control per primary team         Caryn Pimentel MD  General Surgery  [facility]

## 2018-12-04 VITALS
TEMPERATURE: 98 F | WEIGHT: 119.63 LBS | RESPIRATION RATE: 18 BRPM | SYSTOLIC BLOOD PRESSURE: 119 MMHG | BODY MASS INDEX: 19.22 KG/M2 | HEART RATE: 66 BPM | DIASTOLIC BLOOD PRESSURE: 69 MMHG | OXYGEN SATURATION: 97 % | HEIGHT: 66 IN

## 2018-12-04 LAB
ANION GAP SERPL CALC-SCNC: 10 MMOL/L
BASOPHILS # BLD AUTO: 0.04 K/UL
BASOPHILS NFR BLD: 0.4 %
BUN SERPL-MCNC: 7 MG/DL
CALCIUM SERPL-MCNC: 9.4 MG/DL
CHLORIDE SERPL-SCNC: 103 MMOL/L
CO2 SERPL-SCNC: 24 MMOL/L
CREAT SERPL-MCNC: 0.7 MG/DL
DIFFERENTIAL METHOD: ABNORMAL
EOSINOPHIL # BLD AUTO: 0.4 K/UL
EOSINOPHIL NFR BLD: 4 %
ERYTHROCYTE [DISTWIDTH] IN BLOOD BY AUTOMATED COUNT: 12.4 %
EST. GFR  (AFRICAN AMERICAN): >60 ML/MIN/1.73 M^2
EST. GFR  (NON AFRICAN AMERICAN): >60 ML/MIN/1.73 M^2
GLUCOSE SERPL-MCNC: 94 MG/DL
HCT VFR BLD AUTO: 42.9 %
HGB BLD-MCNC: 14.8 G/DL
LYMPHOCYTES # BLD AUTO: 2.8 K/UL
LYMPHOCYTES NFR BLD: 30.2 %
MCH RBC QN AUTO: 34.3 PG
MCHC RBC AUTO-ENTMCNC: 34.5 G/DL
MCV RBC AUTO: 100 FL
MONOCYTES # BLD AUTO: 1.3 K/UL
MONOCYTES NFR BLD: 13.5 %
NEUTROPHILS # BLD AUTO: 4.9 K/UL
NEUTROPHILS NFR BLD: 51.9 %
PLATELET # BLD AUTO: 296 K/UL
PMV BLD AUTO: 11.1 FL
POTASSIUM SERPL-SCNC: 3.8 MMOL/L
RBC # BLD AUTO: 4.31 M/UL
SODIUM SERPL-SCNC: 137 MMOL/L
WBC # BLD AUTO: 9.35 K/UL

## 2018-12-04 PROCEDURE — 36415 COLL VENOUS BLD VENIPUNCTURE: CPT

## 2018-12-04 PROCEDURE — 85025 COMPLETE CBC W/AUTO DIFF WBC: CPT

## 2018-12-04 PROCEDURE — S4991 NICOTINE PATCH NONLEGEND: HCPCS | Performed by: NURSE PRACTITIONER

## 2018-12-04 PROCEDURE — 80048 BASIC METABOLIC PNL TOTAL CA: CPT

## 2018-12-04 PROCEDURE — 63600175 PHARM REV CODE 636 W HCPCS: Performed by: NURSE PRACTITIONER

## 2018-12-04 PROCEDURE — 25000003 PHARM REV CODE 250: Performed by: NURSE PRACTITIONER

## 2018-12-04 RX ORDER — LANOLIN ALCOHOL/MO/W.PET/CERES
100 CREAM (GRAM) TOPICAL DAILY
COMMUNITY
Start: 2018-12-05 | End: 2018-12-19

## 2018-12-04 RX ORDER — AMLODIPINE BESYLATE 5 MG/1
5 TABLET ORAL DAILY
Qty: 30 TABLET | Refills: 1 | Status: SHIPPED | OUTPATIENT
Start: 2018-12-05 | End: 2022-12-30

## 2018-12-04 RX ORDER — HYDROCODONE BITARTRATE AND ACETAMINOPHEN 10; 325 MG/1; MG/1
1 TABLET ORAL EVERY 6 HOURS PRN
Qty: 30 TABLET | Refills: 0 | Status: SHIPPED | OUTPATIENT
Start: 2018-12-04 | End: 2018-12-19

## 2018-12-04 RX ADMIN — MORPHINE SULFATE 2 MG: 2 INJECTION, SOLUTION INTRAMUSCULAR; INTRAVENOUS at 05:12

## 2018-12-04 RX ADMIN — AMLODIPINE BESYLATE 5 MG: 5 TABLET ORAL at 08:12

## 2018-12-04 RX ADMIN — THERA TABS 1 TABLET: TAB at 08:12

## 2018-12-04 RX ADMIN — Medication 100 MG: at 08:12

## 2018-12-04 RX ADMIN — NICOTINE 1 PATCH: 21 PATCH, EXTENDED RELEASE TRANSDERMAL at 08:12

## 2018-12-04 RX ADMIN — MORPHINE SULFATE 2 MG: 2 INJECTION, SOLUTION INTRAMUSCULAR; INTRAVENOUS at 09:12

## 2018-12-04 RX ADMIN — Medication 1 TABLET: at 08:12

## 2018-12-04 RX ADMIN — HYDROCODONE BITARTRATE AND ACETAMINOPHEN 1 TABLET: 10; 325 TABLET ORAL at 10:12

## 2018-12-04 RX ADMIN — MORPHINE SULFATE 2 MG: 2 INJECTION, SOLUTION INTRAMUSCULAR; INTRAVENOUS at 12:12

## 2018-12-04 NOTE — PLAN OF CARE
Problem: Patient Care Overview  Goal: Plan of Care Review  Outcome: Ongoing (interventions implemented as appropriate)  Pt complains of pain to right lateral chest. Pain medication is effective.  Dressing is dry and intact. No acute respiratory distress noted. No injuries. Will continue to monitor. 12 hour chart check is completed.

## 2018-12-04 NOTE — DISCHARGE SUMMARY
[facility] Hospital Medicine  Discharge Summary      Patient Name: Lucas Bernal II  MRN: [id number]  Admission Date: 12/1/2018  Hospital Length of Stay: 3 days  Discharge Date and Time:  12/04/2018 11:42 AM  Attending Physician: Pavel Prince MD   Discharging Provider: Josie Flores NP  Primary Care Provider: Primary Doctor No      HPI:   Lucas Bernal II is a 48 year old male with a PMHx of HTN, Tobacco abuse (1/2 PPD), and ETOH abuse (admit to drinking six pack of beer/day) who presented to the Emergency Department with c/o right side rib pain. Pt reports he tripped over his boot and fell on the edge of the table. CBC/CMP unremarkable. Troponin < 0.006. CXR showed right side pneumothorax and multiple right side rib fractures. Right rib xray with same findings. ED discussed case with General Surgery who recommended CT (16F) and admission to .     * No surgery found *      Hospital Course:   Patient was admitted to Med Surg for pneumothorax under the care of Hospital Medicine. General surgery placed R side chest tube. Patient reports he is breathing better now. CXR shows no pneumothorax. Will likely need chest tube for 24-48 additional hours. General surgery managing.     12/3/18 Chest tube to waterseal today by General Surgery. Stills has significant pain with breathing due to broken ribs.     12/4/18 Chest tube removed yesterday by General Surgery. Pain today has significantly improved. He will continue pain management for broken ribs. Counseled on alcohol abuse. OTC vitamins were prescribed. He was asked to follow up with PCP in 3 days. Patient seen and examined on date of discharge and deemed suitable.      Consults:     No new Assessment & Plan notes have been filed under this hospital service since the last note was generated.  Service: Hospital Medicine    Final Active Diagnoses:    Diagnosis Date Noted POA    PRINCIPAL PROBLEM:  Pneumothorax [J93.9] 12/01/2018 Yes     Closed fracture of multiple ribs of right side [S22.41XA] 12/01/2018 Yes    Essential hypertension [I10] 12/01/2018 Yes    Tobacco abuse [Z72.0] 12/01/2018 Yes    ETOH abuse [F10.10] 12/01/2018 Yes      Problems Resolved During this Admission:       Discharged Condition: stable    Disposition: Home or Self Care    Follow Up:  Follow-up Information     Rita Lozada PA-C.    Specialty:  General Surgery  Contact information:  [address] [place] [address] [phone]                 Patient Instructions:      Activity as tolerated       Significant Diagnostic Studies: Labs:   CMP   Recent Labs   Lab 12/03/18  0458 12/04/18  0419    137   K 4.0 3.8    103   CO2 22* 24    94   BUN 8 7   CREATININE 0.7 0.7   CALCIUM 9.3 9.4   ANIONGAP 9 10   ESTGFRAFRICA >60 >60   EGFRNONAA >60 >60    and CBC   Recent Labs   Lab 12/03/18 0458 12/04/18 0419   WBC 9.22 9.35   HGB 14.5 14.8   HCT 41.8 42.9    296       Pending Diagnostic Studies:     None         Medications:  Reconciled Home Medications:      Medication List      START taking these medications    amLODIPine 5 MG tablet  Commonly known as:  NORVASC  Take 1 tablet (5 mg total) by mouth once daily.  Start taking on:  12/5/2018     HYDROcodone-acetaminophen  mg per tablet  Commonly known as:  NORCO  Take 1 tablet by mouth every 6 (six) hours as needed.     thiamine 100 MG tablet  Take 1 tablet (100 mg total) by mouth once daily.  Start taking on:  12/5/2018            Indwelling Lines/Drains at time of discharge:   Lines/Drains/Airways          None          Time spent on the discharge of patient: >35 minutes  Patient was seen and examined on the date of discharge and determined to be suitable for discharge.         Josie Flores NP  Department of Hospital Medicine  [facility]

## 2018-12-04 NOTE — NURSING
"Pt stated he does not want to take librium anymore "its starting to make me have crazy dreams." Informed pt it is for alcohol withdraws. Pt stated I know but I  does not drink that much.  "
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
## 2018-12-04 NOTE — PLAN OF CARE
12/04/18 1437   Final Note   Assessment Type Final Discharge Note   Anticipated Discharge Disposition Home   Right Care Referral Info   Post Acute Recommendation No Care

## 2018-12-19 ENCOUNTER — OFFICE VISIT (OUTPATIENT)
Dept: SURGERY | Facility: CLINIC | Age: 48
End: 2018-12-19
Payer: MEDICAID

## 2018-12-19 VITALS
TEMPERATURE: 98 F | WEIGHT: 121.5 LBS | BODY MASS INDEX: 19.61 KG/M2 | HEART RATE: 89 BPM | DIASTOLIC BLOOD PRESSURE: 92 MMHG | SYSTOLIC BLOOD PRESSURE: 128 MMHG

## 2018-12-19 DIAGNOSIS — Z98.890 POST-OPERATIVE STATE: Primary | ICD-10-CM

## 2018-12-19 DIAGNOSIS — S27.0XXS TRAUMATIC PNEUMOTHORAX, SEQUELA: ICD-10-CM

## 2018-12-19 PROCEDURE — 99999 PR PBB SHADOW E&M-EST. PATIENT-LVL III: CPT | Mod: PBBFAC,,, | Performed by: PHYSICIAN ASSISTANT

## 2018-12-19 PROCEDURE — 99024 POSTOP FOLLOW-UP VISIT: CPT | Mod: ,,, | Performed by: PHYSICIAN ASSISTANT

## 2018-12-19 PROCEDURE — 99213 OFFICE O/P EST LOW 20 MIN: CPT | Mod: PBBFAC | Performed by: PHYSICIAN ASSISTANT
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
## 2019-06-02 ENCOUNTER — HOSPITAL ENCOUNTER (EMERGENCY)
Facility: HOSPITAL | Age: 49
Discharge: HOME | End: 2019-06-03
Attending: EMERGENCY MEDICINE
Payer: MEDICAID
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
## 2019-06-02 DIAGNOSIS — S20.212A CHEST WALL CONTUSION, LEFT, INITIAL ENCOUNTER: ICD-10-CM

## 2019-06-02 PROCEDURE — 99284 EMERGENCY DEPT VISIT MOD MDM: CPT

## 2019-06-03 VITALS
HEIGHT: 67 IN | DIASTOLIC BLOOD PRESSURE: 83 MMHG | SYSTOLIC BLOOD PRESSURE: 125 MMHG | BODY MASS INDEX: 18.42 KG/M2 | RESPIRATION RATE: 16 BRPM | HEART RATE: 80 BPM | OXYGEN SATURATION: 96 % | WEIGHT: 117.38 LBS | TEMPERATURE: 99 F

## 2019-06-03 RX ORDER — CYCLOBENZAPRINE HCL 10 MG
10 TABLET ORAL 3 TIMES DAILY PRN
Qty: 15 TABLET | Refills: 0 | Status: SHIPPED | OUTPATIENT
Start: 2019-06-03 | End: 2019-06-08

## 2019-06-03 RX ORDER — HYDROCODONE BITARTRATE AND ACETAMINOPHEN 10; 325 MG/1; MG/1
1 TABLET ORAL
Status: DISCONTINUED | OUTPATIENT
Start: 2019-06-03 | End: 2019-06-03

## 2019-06-03 RX ORDER — DICLOFENAC SODIUM 50 MG/1
50 TABLET, DELAYED RELEASE ORAL 3 TIMES DAILY PRN
Qty: 15 TABLET | Refills: 0 | Status: SHIPPED | OUTPATIENT
Start: 2019-06-03 | End: 2022-12-30

## 2019-06-03 NOTE — ED NOTES
RN came to offer pt. His medication. Pt. States that he has to call for his ride. Explained to pt. That he will need a ride present for RN to be able to administer narcotic. Pt. Verbalizes understanding.

## 2019-06-03 NOTE — ED NOTES
"RN back to room to see if pt's ride is here. Pt. States that it could be hours before his ride gets here, and "I'm just gonna go." Pt. States that he will get his RXs filled instead. DC indicated per MD. DC instructions explained to pt., who verbalized understanding. NAD, VSS, resp. E/u. AAOx4. Ambulatory out of ED with even, steady gait. Copy of DC instructions provided to registration team member to give to patient with checkout. Pt. At registration desk for checkout.  "
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
## 2022-12-29 ENCOUNTER — HOSPITAL ENCOUNTER (INPATIENT)
Facility: HOSPITAL | Age: 52
LOS: 4 days | Discharge: HOME | DRG: 208 | End: 2023-01-02
Attending: EMERGENCY MEDICINE | Admitting: INTERNAL MEDICINE
Payer: COMMERCIAL

## 2022-12-29 DIAGNOSIS — M62.82 NON-TRAUMATIC RHABDOMYOLYSIS: ICD-10-CM

## 2022-12-29 DIAGNOSIS — F15.10 METHAMPHETAMINE USE: ICD-10-CM

## 2022-12-29 DIAGNOSIS — F10.10 ETOH ABUSE: ICD-10-CM

## 2022-12-29 DIAGNOSIS — F23 ACUTE PSYCHOSIS: Primary | ICD-10-CM

## 2022-12-29 DIAGNOSIS — F29 PSYCHOSIS: ICD-10-CM

## 2022-12-29 LAB
ALBUMIN SERPL BCP-MCNC: 4 G/DL (ref 3.5–5.2)
ALP SERPL-CCNC: 90 U/L (ref 55–135)
ALT SERPL W/O P-5'-P-CCNC: 8 U/L (ref 10–44)
ANION GAP SERPL CALC-SCNC: 16 MMOL/L (ref 8–16)
AST SERPL-CCNC: 22 U/L (ref 10–40)
BASOPHILS # BLD AUTO: 0.07 K/UL (ref 0–0.2)
BASOPHILS NFR BLD: 0.4 % (ref 0–1.9)
BILIRUB SERPL-MCNC: 0.4 MG/DL (ref 0.1–1)
BUN SERPL-MCNC: 28 MG/DL (ref 6–20)
CALCIUM SERPL-MCNC: 10 MG/DL (ref 8.7–10.5)
CHLORIDE SERPL-SCNC: 103 MMOL/L (ref 95–110)
CK SERPL-CCNC: 351 U/L (ref 20–200)
CO2 SERPL-SCNC: 18 MMOL/L (ref 23–29)
CREAT SERPL-MCNC: 0.9 MG/DL (ref 0.5–1.4)
DIFFERENTIAL METHOD: ABNORMAL
EOSINOPHIL # BLD AUTO: 0.1 K/UL (ref 0–0.5)
EOSINOPHIL NFR BLD: 0.7 % (ref 0–8)
ERYTHROCYTE [DISTWIDTH] IN BLOOD BY AUTOMATED COUNT: 12.7 % (ref 11.5–14.5)
EST. GFR  (NO RACE VARIABLE): >60 ML/MIN/1.73 M^2
ETHANOL SERPL-MCNC: <10 MG/DL
GLUCOSE SERPL-MCNC: 111 MG/DL (ref 70–110)
HCT VFR BLD AUTO: 42 % (ref 40–54)
HGB BLD-MCNC: 13.9 G/DL (ref 14–18)
IMM GRANULOCYTES # BLD AUTO: 0.05 K/UL (ref 0–0.04)
IMM GRANULOCYTES NFR BLD AUTO: 0.3 % (ref 0–0.5)
LYMPHOCYTES # BLD AUTO: 3.5 K/UL (ref 1–4.8)
LYMPHOCYTES NFR BLD: 18.9 % (ref 18–48)
MCH RBC QN AUTO: 29.1 PG (ref 27–31)
MCHC RBC AUTO-ENTMCNC: 33.1 G/DL (ref 32–36)
MCV RBC AUTO: 88 FL (ref 82–98)
MONOCYTES # BLD AUTO: 1.3 K/UL (ref 0.3–1)
MONOCYTES NFR BLD: 6.8 % (ref 4–15)
NEUTROPHILS # BLD AUTO: 13.4 K/UL (ref 1.8–7.7)
NEUTROPHILS NFR BLD: 72.9 % (ref 38–73)
NRBC BLD-RTO: 0 /100 WBC
PLATELET # BLD AUTO: 373 K/UL (ref 150–450)
PMV BLD AUTO: 10.1 FL (ref 9.2–12.9)
POTASSIUM SERPL-SCNC: 4.1 MMOL/L (ref 3.5–5.1)
PROT SERPL-MCNC: 8.8 G/DL (ref 6–8.4)
RBC # BLD AUTO: 4.77 M/UL (ref 4.6–6.2)
SODIUM SERPL-SCNC: 137 MMOL/L (ref 136–145)
WBC # BLD AUTO: 18.38 K/UL (ref 3.9–12.7)

## 2022-12-29 PROCEDURE — 96372 THER/PROPH/DIAG INJ SC/IM: CPT | Performed by: EMERGENCY MEDICINE

## 2022-12-29 PROCEDURE — 99291 CRITICAL CARE FIRST HOUR: CPT | Mod: 25

## 2022-12-29 PROCEDURE — 96376 TX/PRO/DX INJ SAME DRUG ADON: CPT

## 2022-12-29 PROCEDURE — 96374 THER/PROPH/DIAG INJ IV PUSH: CPT

## 2022-12-29 PROCEDURE — 85025 COMPLETE CBC W/AUTO DIFF WBC: CPT | Performed by: EMERGENCY MEDICINE

## 2022-12-29 PROCEDURE — 82550 ASSAY OF CK (CPK): CPT | Performed by: EMERGENCY MEDICINE

## 2022-12-29 PROCEDURE — 31500 INSERT EMERGENCY AIRWAY: CPT | Mod: 59

## 2022-12-29 PROCEDURE — 96375 TX/PRO/DX INJ NEW DRUG ADDON: CPT

## 2022-12-29 PROCEDURE — 96361 HYDRATE IV INFUSION ADD-ON: CPT

## 2022-12-29 PROCEDURE — 80307 DRUG TEST PRSMV CHEM ANLYZR: CPT | Performed by: EMERGENCY MEDICINE

## 2022-12-29 PROCEDURE — 80053 COMPREHEN METABOLIC PANEL: CPT | Performed by: EMERGENCY MEDICINE

## 2022-12-29 PROCEDURE — 63600175 PHARM REV CODE 636 W HCPCS: Performed by: EMERGENCY MEDICINE

## 2022-12-29 PROCEDURE — 11000001 HC ACUTE MED/SURG PRIVATE ROOM

## 2022-12-29 PROCEDURE — 82077 ASSAY SPEC XCP UR&BREATH IA: CPT | Performed by: EMERGENCY MEDICINE

## 2022-12-29 RX ORDER — HALOPERIDOL 5 MG/ML
5 INJECTION INTRAMUSCULAR ONCE AS NEEDED
Status: COMPLETED | OUTPATIENT
Start: 2022-12-29 | End: 2022-12-29

## 2022-12-29 RX ORDER — DIPHENHYDRAMINE HYDROCHLORIDE 50 MG/ML
50 INJECTION INTRAMUSCULAR; INTRAVENOUS
Status: COMPLETED | OUTPATIENT
Start: 2022-12-29 | End: 2022-12-29

## 2022-12-29 RX ORDER — LORAZEPAM 2 MG/ML
2 INJECTION INTRAMUSCULAR
Status: COMPLETED | OUTPATIENT
Start: 2022-12-29 | End: 2022-12-29

## 2022-12-29 RX ORDER — LORAZEPAM 2 MG/ML
2 INJECTION INTRAMUSCULAR
Status: COMPLETED | OUTPATIENT
Start: 2022-12-30 | End: 2022-12-29

## 2022-12-29 RX ADMIN — DIPHENHYDRAMINE HYDROCHLORIDE 50 MG: 50 INJECTION, SOLUTION INTRAMUSCULAR; INTRAVENOUS at 10:12

## 2022-12-29 RX ADMIN — LORAZEPAM 2 MG: 2 INJECTION INTRAMUSCULAR; INTRAVENOUS at 10:12

## 2022-12-29 RX ADMIN — HALOPERIDOL LACTATE 5 MG: 5 INJECTION, SOLUTION INTRAMUSCULAR at 10:12

## 2022-12-29 RX ADMIN — LORAZEPAM 2 MG: 2 INJECTION INTRAMUSCULAR; INTRAVENOUS at 11:12

## 2022-12-30 PROBLEM — M62.82 NON-TRAUMATIC RHABDOMYOLYSIS: Status: ACTIVE | Noted: 2022-12-30

## 2022-12-30 PROBLEM — D72.829 LEUKOCYTOSIS: Status: ACTIVE | Noted: 2022-12-30

## 2022-12-30 PROBLEM — F19.10 POLYSUBSTANCE ABUSE: Status: ACTIVE | Noted: 2022-12-30

## 2022-12-30 PROBLEM — Z99.11 ON MECHANICALLY ASSISTED VENTILATION: Status: ACTIVE | Noted: 2022-12-30

## 2022-12-30 PROBLEM — F23 ACUTE PSYCHOSIS: Status: ACTIVE | Noted: 2022-12-30

## 2022-12-30 LAB
ALBUMIN SERPL BCP-MCNC: 3.2 G/DL (ref 3.5–5.2)
ALLENS TEST: ABNORMAL
ALP SERPL-CCNC: 76 U/L (ref 55–135)
ALT SERPL W/O P-5'-P-CCNC: 16 U/L (ref 10–44)
AMPHET+METHAMPHET UR QL: ABNORMAL
ANION GAP SERPL CALC-SCNC: 11 MMOL/L (ref 8–16)
ANION GAP SERPL CALC-SCNC: 12 MMOL/L (ref 8–16)
AST SERPL-CCNC: 65 U/L (ref 10–40)
BARBITURATES UR QL SCN>200 NG/ML: NEGATIVE
BASOPHILS # BLD AUTO: 0.06 K/UL (ref 0–0.2)
BASOPHILS NFR BLD: 0.4 % (ref 0–1.9)
BENZODIAZ UR QL SCN>200 NG/ML: ABNORMAL
BILIRUB DIRECT SERPL-MCNC: 0.3 MG/DL (ref 0.1–0.3)
BILIRUB SERPL-MCNC: 0.6 MG/DL (ref 0.1–1)
BILIRUB UR QL STRIP: NEGATIVE
BNP SERPL-MCNC: 36 PG/ML (ref 0–99)
BUN SERPL-MCNC: 15 MG/DL (ref 6–20)
BUN SERPL-MCNC: 18 MG/DL (ref 6–20)
BZE UR QL SCN: NEGATIVE
CALCIUM SERPL-MCNC: 9 MG/DL (ref 8.7–10.5)
CALCIUM SERPL-MCNC: 9.3 MG/DL (ref 8.7–10.5)
CANNABINOIDS UR QL SCN: ABNORMAL
CHLORIDE SERPL-SCNC: 111 MMOL/L (ref 95–110)
CHLORIDE SERPL-SCNC: 113 MMOL/L (ref 95–110)
CK SERPL-CCNC: 1923 U/L (ref 20–200)
CK SERPL-CCNC: 1931 U/L (ref 20–200)
CK SERPL-CCNC: 2653 U/L (ref 20–200)
CLARITY UR: CLEAR
CO2 SERPL-SCNC: 17 MMOL/L (ref 23–29)
CO2 SERPL-SCNC: 20 MMOL/L (ref 23–29)
COLOR UR: YELLOW
CREAT SERPL-MCNC: 0.7 MG/DL (ref 0.5–1.4)
CREAT SERPL-MCNC: 0.8 MG/DL (ref 0.5–1.4)
CREAT UR-MCNC: 54.3 MG/DL (ref 23–375)
DELSYS: ABNORMAL
DIFFERENTIAL METHOD: ABNORMAL
EOSINOPHIL # BLD AUTO: 0.2 K/UL (ref 0–0.5)
EOSINOPHIL NFR BLD: 1.6 % (ref 0–8)
ERYTHROCYTE [DISTWIDTH] IN BLOOD BY AUTOMATED COUNT: 13.2 % (ref 11.5–14.5)
ERYTHROCYTE [SEDIMENTATION RATE] IN BLOOD BY WESTERGREN METHOD: 18 MM/H
EST. GFR  (NO RACE VARIABLE): >60 ML/MIN/1.73 M^2
EST. GFR  (NO RACE VARIABLE): >60 ML/MIN/1.73 M^2
FIO2: 50
GLUCOSE SERPL-MCNC: 79 MG/DL (ref 70–110)
GLUCOSE SERPL-MCNC: 98 MG/DL (ref 70–110)
GLUCOSE UR QL STRIP: NEGATIVE
HCO3 UR-SCNC: 21.1 MMOL/L (ref 24–28)
HCT VFR BLD AUTO: 39.1 % (ref 40–54)
HGB BLD-MCNC: 12.4 G/DL (ref 14–18)
HGB UR QL STRIP: NEGATIVE
IMM GRANULOCYTES # BLD AUTO: 0.09 K/UL (ref 0–0.04)
IMM GRANULOCYTES NFR BLD AUTO: 0.6 % (ref 0–0.5)
KETONES UR QL STRIP: NEGATIVE
LACTATE SERPL-SCNC: 1.4 MMOL/L (ref 0.5–2.2)
LACTATE SERPL-SCNC: 3.6 MMOL/L (ref 0.5–2.2)
LEUKOCYTE ESTERASE UR QL STRIP: NEGATIVE
LYMPHOCYTES # BLD AUTO: 3.7 K/UL (ref 1–4.8)
LYMPHOCYTES NFR BLD: 24.6 % (ref 18–48)
MAGNESIUM SERPL-MCNC: 2 MG/DL (ref 1.6–2.6)
MCH RBC QN AUTO: 29.2 PG (ref 27–31)
MCHC RBC AUTO-ENTMCNC: 31.7 G/DL (ref 32–36)
MCV RBC AUTO: 92 FL (ref 82–98)
METHADONE UR QL SCN>300 NG/ML: NEGATIVE
MODE: ABNORMAL
MONOCYTES # BLD AUTO: 1.2 K/UL (ref 0.3–1)
MONOCYTES NFR BLD: 8.1 % (ref 4–15)
NEUTROPHILS # BLD AUTO: 9.6 K/UL (ref 1.8–7.7)
NEUTROPHILS NFR BLD: 64.7 % (ref 38–73)
NITRITE UR QL STRIP: NEGATIVE
NRBC BLD-RTO: 0 /100 WBC
OPIATES UR QL SCN: NEGATIVE
PCO2 BLDA: 39.9 MMHG (ref 35–45)
PCP UR QL SCN>25 NG/ML: NEGATIVE
PEEP: 5
PH SMN: 7.33 [PH] (ref 7.35–7.45)
PH UR STRIP: >8 [PH] (ref 5–8)
PLATELET # BLD AUTO: 259 K/UL (ref 150–450)
PMV BLD AUTO: 10.1 FL (ref 9.2–12.9)
PO2 BLDA: 237 MMHG (ref 80–100)
POC BE: -5 MMOL/L
POC SATURATED O2: 100 % (ref 95–100)
POTASSIUM SERPL-SCNC: 4.3 MMOL/L (ref 3.5–5.1)
POTASSIUM SERPL-SCNC: 4.4 MMOL/L (ref 3.5–5.1)
PROT SERPL-MCNC: 7 G/DL (ref 6–8.4)
PROT UR QL STRIP: ABNORMAL
RBC # BLD AUTO: 4.25 M/UL (ref 4.6–6.2)
SAMPLE: ABNORMAL
SARS-COV-2 RDRP RESP QL NAA+PROBE: NEGATIVE
SITE: ABNORMAL
SODIUM SERPL-SCNC: 142 MMOL/L (ref 136–145)
SODIUM SERPL-SCNC: 142 MMOL/L (ref 136–145)
SP GR UR STRIP: 1.02 (ref 1–1.03)
T4 FREE SERPL-MCNC: 1.01 NG/DL (ref 0.71–1.51)
TOXICOLOGY INFORMATION: ABNORMAL
TROPONIN I SERPL DL<=0.01 NG/ML-MCNC: 0.01 NG/ML (ref 0–0.03)
TSH SERPL DL<=0.005 MIU/L-ACNC: 0.35 UIU/ML (ref 0.4–4)
URN SPEC COLLECT METH UR: ABNORMAL
UROBILINOGEN UR STRIP-ACNC: NEGATIVE EU/DL
VT: 450
WBC # BLD AUTO: 14.81 K/UL (ref 3.9–12.7)

## 2022-12-30 PROCEDURE — 25000003 PHARM REV CODE 250: Performed by: EMERGENCY MEDICINE

## 2022-12-30 PROCEDURE — 94002 VENT MGMT INPAT INIT DAY: CPT

## 2022-12-30 PROCEDURE — 25000003 PHARM REV CODE 250: Performed by: NURSE PRACTITIONER

## 2022-12-30 PROCEDURE — 31500 INSERT EMERGENCY AIRWAY: CPT

## 2022-12-30 PROCEDURE — 99291 PR CRITICAL CARE, E/M 30-74 MINUTES: ICD-10-PCS | Mod: ,,, | Performed by: NURSE PRACTITIONER

## 2022-12-30 PROCEDURE — 36600 WITHDRAWAL OF ARTERIAL BLOOD: CPT

## 2022-12-30 PROCEDURE — C9399 UNCLASSIFIED DRUGS OR BIOLOG: HCPCS | Performed by: NURSE PRACTITIONER

## 2022-12-30 PROCEDURE — 63600175 PHARM REV CODE 636 W HCPCS: Performed by: NURSE PRACTITIONER

## 2022-12-30 PROCEDURE — 93005 ELECTROCARDIOGRAM TRACING: CPT

## 2022-12-30 PROCEDURE — C9399 UNCLASSIFIED DRUGS OR BIOLOG: HCPCS | Performed by: EMERGENCY MEDICINE

## 2022-12-30 PROCEDURE — 80076 HEPATIC FUNCTION PANEL: CPT | Performed by: NURSE PRACTITIONER

## 2022-12-30 PROCEDURE — 80048 BASIC METABOLIC PNL TOTAL CA: CPT | Performed by: NURSE PRACTITIONER

## 2022-12-30 PROCEDURE — 96372 THER/PROPH/DIAG INJ SC/IM: CPT | Performed by: EMERGENCY MEDICINE

## 2022-12-30 PROCEDURE — 80048 BASIC METABOLIC PNL TOTAL CA: CPT | Mod: 91 | Performed by: NURSE PRACTITIONER

## 2022-12-30 PROCEDURE — 63600175 PHARM REV CODE 636 W HCPCS

## 2022-12-30 PROCEDURE — 83880 ASSAY OF NATRIURETIC PEPTIDE: CPT | Performed by: EMERGENCY MEDICINE

## 2022-12-30 PROCEDURE — 82550 ASSAY OF CK (CPK): CPT | Performed by: EMERGENCY MEDICINE

## 2022-12-30 PROCEDURE — U0002 COVID-19 LAB TEST NON-CDC: HCPCS | Performed by: EMERGENCY MEDICINE

## 2022-12-30 PROCEDURE — 63600175 PHARM REV CODE 636 W HCPCS: Performed by: EMERGENCY MEDICINE

## 2022-12-30 PROCEDURE — 99900035 HC TECH TIME PER 15 MIN (STAT)

## 2022-12-30 PROCEDURE — 83605 ASSAY OF LACTIC ACID: CPT | Mod: 91 | Performed by: EMERGENCY MEDICINE

## 2022-12-30 PROCEDURE — 84443 ASSAY THYROID STIM HORMONE: CPT | Performed by: EMERGENCY MEDICINE

## 2022-12-30 PROCEDURE — 81003 URINALYSIS AUTO W/O SCOPE: CPT | Performed by: EMERGENCY MEDICINE

## 2022-12-30 PROCEDURE — 84439 ASSAY OF FREE THYROXINE: CPT | Performed by: EMERGENCY MEDICINE

## 2022-12-30 PROCEDURE — 99900026 HC AIRWAY MAINTENANCE (STAT)

## 2022-12-30 PROCEDURE — 83605 ASSAY OF LACTIC ACID: CPT | Performed by: EMERGENCY MEDICINE

## 2022-12-30 PROCEDURE — 20000000 HC ICU ROOM

## 2022-12-30 PROCEDURE — 85025 COMPLETE CBC W/AUTO DIFF WBC: CPT | Performed by: NURSE PRACTITIONER

## 2022-12-30 PROCEDURE — 87040 BLOOD CULTURE FOR BACTERIA: CPT | Mod: 59 | Performed by: EMERGENCY MEDICINE

## 2022-12-30 PROCEDURE — 27000221 HC OXYGEN, UP TO 24 HOURS

## 2022-12-30 PROCEDURE — 94761 N-INVAS EAR/PLS OXIMETRY MLT: CPT

## 2022-12-30 PROCEDURE — 82550 ASSAY OF CK (CPK): CPT | Mod: 91 | Performed by: NURSE PRACTITIONER

## 2022-12-30 PROCEDURE — 82803 BLOOD GASES ANY COMBINATION: CPT

## 2022-12-30 PROCEDURE — 83735 ASSAY OF MAGNESIUM: CPT | Performed by: NURSE PRACTITIONER

## 2022-12-30 PROCEDURE — 99291 CRITICAL CARE FIRST HOUR: CPT | Mod: ,,, | Performed by: NURSE PRACTITIONER

## 2022-12-30 PROCEDURE — 25000003 PHARM REV CODE 250: Performed by: INTERNAL MEDICINE

## 2022-12-30 PROCEDURE — 93010 EKG 12-LEAD: ICD-10-PCS | Mod: ,,, | Performed by: INTERNAL MEDICINE

## 2022-12-30 PROCEDURE — 36415 COLL VENOUS BLD VENIPUNCTURE: CPT | Performed by: NURSE PRACTITIONER

## 2022-12-30 PROCEDURE — 93010 ELECTROCARDIOGRAM REPORT: CPT | Mod: ,,, | Performed by: INTERNAL MEDICINE

## 2022-12-30 PROCEDURE — 84484 ASSAY OF TROPONIN QUANT: CPT | Performed by: EMERGENCY MEDICINE

## 2022-12-30 RX ORDER — SODIUM CHLORIDE, SODIUM LACTATE, POTASSIUM CHLORIDE, CALCIUM CHLORIDE 600; 310; 30; 20 MG/100ML; MG/100ML; MG/100ML; MG/100ML
INJECTION, SOLUTION INTRAVENOUS CONTINUOUS
Status: DISCONTINUED | OUTPATIENT
Start: 2022-12-30 | End: 2023-01-01

## 2022-12-30 RX ORDER — ENOXAPARIN SODIUM 100 MG/ML
40 INJECTION SUBCUTANEOUS EVERY 24 HOURS
Status: DISCONTINUED | OUTPATIENT
Start: 2022-12-30 | End: 2023-01-02 | Stop reason: HOSPADM

## 2022-12-30 RX ORDER — PROPOFOL 10 MG/ML
0-50 INJECTION, EMULSION INTRAVENOUS CONTINUOUS
Status: DISCONTINUED | OUTPATIENT
Start: 2022-12-30 | End: 2023-01-01

## 2022-12-30 RX ORDER — SODIUM CHLORIDE 0.9 % (FLUSH) 0.9 %
10 SYRINGE (ML) INJECTION
Status: DISCONTINUED | OUTPATIENT
Start: 2022-12-30 | End: 2023-01-02 | Stop reason: HOSPADM

## 2022-12-30 RX ORDER — LORAZEPAM 2 MG/ML
2 INJECTION INTRAMUSCULAR
Status: COMPLETED | OUTPATIENT
Start: 2022-12-30 | End: 2022-12-30

## 2022-12-30 RX ORDER — LORAZEPAM 2 MG/ML
4 INJECTION INTRAMUSCULAR
Status: COMPLETED | OUTPATIENT
Start: 2022-12-30 | End: 2022-12-30

## 2022-12-30 RX ORDER — DEXMEDETOMIDINE HYDROCHLORIDE 4 UG/ML
0-1.4 INJECTION INTRAVENOUS CONTINUOUS
Status: DISCONTINUED | OUTPATIENT
Start: 2022-12-30 | End: 2022-12-30

## 2022-12-30 RX ORDER — ETOMIDATE 2 MG/ML
20 INJECTION INTRAVENOUS
Status: COMPLETED | OUTPATIENT
Start: 2022-12-30 | End: 2022-12-30

## 2022-12-30 RX ORDER — FENTANYL CITRATE-0.9 % NACL/PF 10 MCG/ML
0-200 PLASTIC BAG, INJECTION (ML) INTRAVENOUS CONTINUOUS
Status: DISCONTINUED | OUTPATIENT
Start: 2022-12-30 | End: 2022-12-31

## 2022-12-30 RX ORDER — ZIPRASIDONE MESYLATE 20 MG/ML
20 INJECTION, POWDER, LYOPHILIZED, FOR SOLUTION INTRAMUSCULAR
Status: COMPLETED | OUTPATIENT
Start: 2022-12-30 | End: 2022-12-30

## 2022-12-30 RX ORDER — CHLORHEXIDINE GLUCONATE ORAL RINSE 1.2 MG/ML
15 SOLUTION DENTAL 2 TIMES DAILY
Status: DISCONTINUED | OUTPATIENT
Start: 2022-12-30 | End: 2023-01-02 | Stop reason: HOSPADM

## 2022-12-30 RX ORDER — ONDANSETRON 2 MG/ML
4 INJECTION INTRAMUSCULAR; INTRAVENOUS EVERY 8 HOURS PRN
Status: DISCONTINUED | OUTPATIENT
Start: 2022-12-30 | End: 2023-01-02 | Stop reason: HOSPADM

## 2022-12-30 RX ORDER — PROPOFOL 10 MG/ML
INJECTION, EMULSION INTRAVENOUS
Status: COMPLETED
Start: 2022-12-30 | End: 2022-12-30

## 2022-12-30 RX ORDER — PROPOFOL 10 MG/ML
0-50 INJECTION, EMULSION INTRAVENOUS
Status: COMPLETED | OUTPATIENT
Start: 2022-12-30 | End: 2022-12-30

## 2022-12-30 RX ORDER — SUCCINYLCHOLINE CHLORIDE 20 MG/ML
120 INJECTION INTRAMUSCULAR; INTRAVENOUS
Status: COMPLETED | OUTPATIENT
Start: 2022-12-30 | End: 2022-12-30

## 2022-12-30 RX ORDER — MUPIROCIN 20 MG/G
OINTMENT TOPICAL 2 TIMES DAILY
Status: DISCONTINUED | OUTPATIENT
Start: 2022-12-30 | End: 2023-01-02 | Stop reason: HOSPADM

## 2022-12-30 RX ORDER — FAMOTIDINE 10 MG/ML
20 INJECTION INTRAVENOUS 2 TIMES DAILY
Status: DISCONTINUED | OUTPATIENT
Start: 2022-12-30 | End: 2023-01-02 | Stop reason: HOSPADM

## 2022-12-30 RX ORDER — FENTANYL CITRATE 50 UG/ML
50 INJECTION, SOLUTION INTRAMUSCULAR; INTRAVENOUS
Status: DISCONTINUED | OUTPATIENT
Start: 2022-12-30 | End: 2023-01-02

## 2022-12-30 RX ORDER — ACETAMINOPHEN 325 MG/1
650 TABLET ORAL EVERY 4 HOURS PRN
Status: DISCONTINUED | OUTPATIENT
Start: 2022-12-30 | End: 2023-01-02 | Stop reason: HOSPADM

## 2022-12-30 RX ADMIN — FAMOTIDINE 20 MG: 10 INJECTION, SOLUTION INTRAVENOUS at 08:12

## 2022-12-30 RX ADMIN — PROPOFOL 20 MCG/KG/MIN: 10 INJECTION, EMULSION INTRAVENOUS at 04:12

## 2022-12-30 RX ADMIN — VANCOMYCIN HYDROCHLORIDE 1500 MG: 1.5 INJECTION, POWDER, LYOPHILIZED, FOR SOLUTION INTRAVENOUS at 05:12

## 2022-12-30 RX ADMIN — PROPOFOL 35 MCG/KG/MIN: 10 INJECTION, EMULSION INTRAVENOUS at 07:12

## 2022-12-30 RX ADMIN — CHLORHEXIDINE GLUCONATE 0.12% ORAL RINSE 15 ML: 1.2 LIQUID ORAL at 08:12

## 2022-12-30 RX ADMIN — SODIUM CHLORIDE, POTASSIUM CHLORIDE, SODIUM LACTATE AND CALCIUM CHLORIDE: 600; 310; 30; 20 INJECTION, SOLUTION INTRAVENOUS at 06:12

## 2022-12-30 RX ADMIN — ENOXAPARIN SODIUM 40 MG: 40 INJECTION SUBCUTANEOUS at 06:12

## 2022-12-30 RX ADMIN — SODIUM CHLORIDE 1000 ML: 9 INJECTION, SOLUTION INTRAVENOUS at 02:12

## 2022-12-30 RX ADMIN — SUCCINYLCHOLINE CHLORIDE 120 MG: 20 INJECTION, SOLUTION INTRAMUSCULAR; INTRAVENOUS at 04:12

## 2022-12-30 RX ADMIN — ZIPRASIDONE MESYLATE 20 MG: 20 INJECTION, POWDER, LYOPHILIZED, FOR SOLUTION INTRAMUSCULAR at 01:12

## 2022-12-30 RX ADMIN — PROPOFOL 30 MCG/KG/MIN: 10 INJECTION, EMULSION INTRAVENOUS at 06:12

## 2022-12-30 RX ADMIN — SODIUM CHLORIDE, POTASSIUM CHLORIDE, SODIUM LACTATE AND CALCIUM CHLORIDE: 600; 310; 30; 20 INJECTION, SOLUTION INTRAVENOUS at 01:12

## 2022-12-30 RX ADMIN — FENTANYL CITRATE 50 MCG: 50 INJECTION INTRAMUSCULAR; INTRAVENOUS at 03:12

## 2022-12-30 RX ADMIN — SODIUM CHLORIDE, POTASSIUM CHLORIDE, SODIUM LACTATE AND CALCIUM CHLORIDE: 600; 310; 30; 20 INJECTION, SOLUTION INTRAVENOUS at 07:12

## 2022-12-30 RX ADMIN — MUPIROCIN: 20 OINTMENT TOPICAL at 08:12

## 2022-12-30 RX ADMIN — LORAZEPAM 4 MG: 2 INJECTION INTRAMUSCULAR; INTRAVENOUS at 02:12

## 2022-12-30 RX ADMIN — LORAZEPAM 2 MG: 2 INJECTION INTRAMUSCULAR; INTRAVENOUS at 04:12

## 2022-12-30 RX ADMIN — DEXMEDETOMIDINE HYDROCHLORIDE 0.1 MCG/KG/HR: 4 INJECTION INTRAVENOUS at 01:12

## 2022-12-30 RX ADMIN — DEXMEDETOMIDINE HYDROCHLORIDE 0.7 MCG/KG/HR: 4 INJECTION INTRAVENOUS at 08:12

## 2022-12-30 RX ADMIN — SODIUM CHLORIDE 1000 ML: 0.9 INJECTION, SOLUTION INTRAVENOUS at 02:12

## 2022-12-30 RX ADMIN — Medication 25 MCG/HR: at 03:12

## 2022-12-30 RX ADMIN — VANCOMYCIN HYDROCHLORIDE 1000 MG: 1 INJECTION, POWDER, LYOPHILIZED, FOR SOLUTION INTRAVENOUS at 06:12

## 2022-12-30 RX ADMIN — PROPOFOL 25 MCG/KG/MIN: 10 INJECTION, EMULSION INTRAVENOUS at 01:12

## 2022-12-30 RX ADMIN — ETOMIDATE 20 MG: 2 INJECTION, SOLUTION INTRAVENOUS at 04:12

## 2022-12-30 RX ADMIN — DEXMEDETOMIDINE HYDROCHLORIDE 0.8 MCG/KG/HR: 4 INJECTION INTRAVENOUS at 06:12
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
## 2022-12-30 NOTE — PROGRESS NOTES
Pharmacokinetic Initial Assessment: IV Vancomycin    Assessment/Plan:    Initiate intravenous vancomycin with loading dose of 1500 mg once followed by a maintenance dose of vancomycin 1000 mg IV every 12 hours  Desired empiric serum trough concentration is 10 to 15 mcg/mL  Draw vancomycin trough level 60 min prior to fourth dose on 12/31/22 at approximately 1700.  Pharmacy will continue to follow and monitor vancomycin.      Please contact pharmacy at extension [phone] with any questions regarding this assessment.     Thank you for the consult,   Rusty Baker       Patient brief summary:  Lucas Bernal II is a 52 y.o. male initiated on antimicrobial therapy with IV Vancomycin for treatment of suspected skin & soft tissue infection    Drug Allergies:   Review of patient's allergies indicates:   Allergen Reactions    Pcn [penicillins]        Actual Body Weight:   61.2 kg    Renal Function:   Estimated Creatinine Clearance: 81.2 mL/min (based on SCr of 0.9 mg/dL).,     Dialysis Method (if applicable):  N/A    CBC (last 72 hours):  Recent Labs   Lab Result Units 12/29/22  2302   WBC K/uL 18.38*   Hemoglobin g/dL 13.9*   Hematocrit % 42.0   Platelets K/uL 373   Gran % % 72.9   Lymph % % 18.9   Mono % % 6.8   Eosinophil % % 0.7   Basophil % % 0.4   Differential Method  Automated       Metabolic Panel (last 72 hours):  Recent Labs   Lab Result Units 12/29/22  2302 12/29/22  2306 12/30/22  0043   Sodium mmol/L 137  --   --    Potassium mmol/L 4.1  --   --    Chloride mmol/L 103  --   --    CO2 mmol/L 18*  --   --    Glucose mg/dL 111*  --   --    Glucose, UA   --   --  Negative   BUN mg/dL 28*  --   --    Creatinine mg/dL 0.9  --   --    Creatinine, Urine mg/dL  --  54.3  --    Albumin g/dL 4.0  --   --    Total Bilirubin mg/dL 0.4  --   --    Alkaline Phosphatase U/L 90  --   --    AST U/L 22  --   --    ALT U/L 8*  --   --        Drug levels (last 3 results):  No results for input(s): VANCOMYCINRA, VANCORANDOM,  VANCOMYCINPE, VANCOPEAK, VANCOMYCINTR, VANCOTROUGH in the last 72 hours.    Microbiologic Results:  Microbiology Results (last 7 days)       Procedure Component Value Units Date/Time    Blood culture #1 **CANNOT BE ORDERED STAT** [[id number]] Collected: 12/30/22 0204    Order Status: Sent Specimen: Blood from Peripheral, Hand, Left Updated: 12/30/22 0204    Blood culture #2 **CANNOT BE ORDERED STAT** [[id number]] Collected: 12/30/22 0204    Order Status: Sent Specimen: Blood from Peripheral, Antecubital, Right Updated: 12/30/22 0204

## 2022-12-30 NOTE — PHARMACY MED REC
"Admission Medication History     The home medication history was taken by Salvador Hylton.    You may go to "Admission" then "Reconcile Home Medications" tabs to review and/or act upon these items.     The home medication list has been updated by the Pharmacy department.   Please read ALL comments highlighted in yellow.   Please address this information as you see fit.    Feel free to contact us if you have any questions or require assistance.    Unable to assess; don't believe patient is taking any prescription medications.    The medications listed below were removed from the home medication list. Please reorder if appropriate:  Patient reports no longer taking the following medication(s):  AMLODIPINE 5 MG TABLET  DICLOFENAC 50 MG EC TABLET  IBUPROFEN 800 MG TABLET    Medications listed below were obtained from: [employer] and Patient's pharmacy  (Not in a hospital admission)      Potential issues to be addressed PRIOR TO DISCHARGE: NONE    Salvador Hylton CPhT-Adv  Pharmacy Technician Specialist-Medication History  [employer] [phone]  Secure chat preferred     Current Outpatient Medications on File Prior to Encounter   Medication Sig Dispense Refill Last Dose    [DISCONTINUED] amLODIPine (NORVASC) 5 MG tablet Take 1 tablet (5 mg total) by mouth once daily. 30 tablet 1     [DISCONTINUED] diclofenac (VOLTAREN) 50 MG EC tablet Take 1 tablet (50 mg total) by mouth 3 (three) times daily as needed. 15 tablet 0                            .        "

## 2022-12-30 NOTE — ED PROVIDER NOTES
SCRIBE #1 NOTE: I, Ana M Lind/Lori Izquierdo, am scribing for, and in the presence of, Elio Barnett MD. I have scribed the HPI, ROS, and PEx.    SCRIBE #2 NOTE: I, Marion Dean, am scribing for, and in the presence of,  Evelyn Hartmann Do, MD. I have scribed the remaining portions of the note not scribed by Scribe #1.   History      Chief Complaint   Patient presents with    Withdrawal     Pt apparently detoxing from heroin x 3 days. Pt combative and restrained upon arrival       Review of patient's allergies indicates:   Allergen Reactions    Pcn [penicillins]         HPI   HPI    12/29/2022, 10:42 PM   History obtained from EMS  HPI/ROS limited secondary to pt not being able to provide a reliable history.      History of Present Illness: Lucas Bernal II is a 52 y.o. male patient who presents to the Emergency Department for withdrawal. EMS reported that the pt has detoxing from heroin for 3 days. En route to the ER, EMS administered 2.5 mg of Versed IM. Pt is combative, verbally abusive, screaming, and cursing at staff.  Unable to obtain any reliable history from patient    1:18 AM:  There is no documented psychiatric history with patient and chart review.  Spoke on the phone with patient's son Brandon.  He reports that patient was last seen normal earlier this morning.  When he came home from work today patient was altered.  He reported a temperature of 102°. Patient was saying that he could not breathe.  Son also states that patient drinks liquor daily.  Son denies any known drug use.      Arrival mode: EMS    PCP: Primary Doctor No       Past Medical History:  No past medical history on file.    Past Surgical History:  Past Surgical History:   Procedure Laterality Date    HERNIA REPAIR           Family History:  No family history on file.    Social History:  Social History     Tobacco Use    Smoking status: Every Day     Packs/day: 0.50     Types: Cigarettes    Smokeless tobacco: Not on file    Substance and Sexual Activity    Alcohol use: No    Drug use: No    Sexual activity: Not on file       ROS   Review of Systems   Unable to perform ROS: Other (pt unable to provide a reliable history)     Physical Exam      Initial Vitals   BP Pulse Resp Temp SpO2   12/29/22 2229 12/29/22 2229 12/29/22 2229 12/29/22 2229 12/29/22 2232   126/81 (!) 125 (!) 22 97.9 °F (36.6 °C) 100 %      MAP       --                 Physical Exam  Nursing Notes and Vital Signs Reviewed.  Constitutional: Pt is restrained. Pt is diaphoretic. Patient is in no acute distress. Well-developed and well-nourished.  Head: Atraumatic. Normocephalic.  Eyes: PERRL. EOM intact. Conjunctivae are not pale. No scleral icterus.  ENT: Mucous membranes are moist. Oropharynx is clear and symmetric.    Neck: Supple. Full ROM. No lymphadenopathy.  Cardiovascular: tachycardic. Regular rhythm. No murmurs, rubs, or gallops. Distal pulses are 2+ and symmetric.  Pulmonary/Chest: No respiratory distress. Clear to auscultation bilaterally. No wheezing or rales.  Abdominal: Soft and non-distended.  There is no tenderness.  No rebound, guarding, or rigidity.   Musculoskeletal: Moves all extremities. No obvious deformities. No edema.  Skin: Warm and dry.  Neurological:  Awake, answering questions   Psychiatric: Pt is combative, verbally abusive, screaming, and cursing at staff.    ED Course    Critical Care    Date/Time: 12/30/2022 12:56 AM  Performed by: Elio Barnett MD  Authorized by: Elio Banrett MD   Direct patient critical care time: 10 minutes  Additional history critical care time: 9 minutes  Ordering / reviewing critical care time: 7 minutes  Documentation critical care time: 9 minutes  Total critical care time (exclusive of procedural time) : 35 minutes  Critical care time was exclusive of separately billable procedures and treating other patients and teaching time.  Critical care was necessary to treat or prevent imminent or life-threatening  deterioration of the following conditions: severe agitation requiring chemical sedation.  Critical care was time spent personally by me on the following activities: blood draw for specimens, development of treatment plan with patient or surrogate, interpretation of cardiac output measurements, evaluation of patient's response to treatment, examination of patient, obtaining history from patient or surrogate, ordering and performing treatments and interventions, ordering and review of laboratory studies, ordering and review of radiographic studies, pulse oximetry, re-evaluation of patient's condition and review of old charts.      Intubation    Date/Time: 12/30/2022 4:34 AM  Location procedure was performed: [facility] EMERGENCY DEPARTMENT  Performed by: Evelyn Hartmann Do, MD  Authorized by: Evelyn Hartmann Do, MD   Consent Done: Emergent Situation  Indications: airway protection  Intubation method: direct  Patient status: paralyzed (RSI)  Preoxygenation: bag valve mask  Pretreatment medications: none  Sedatives: etomidate  Paralytic: succinylcholine  Laryngoscope size: Mac 4  Tube size: 8.0 mm  Tube type: cuffed  Number of attempts: 1  Ventilation between attempts: not needed.  Cricoid pressure: yes  Cords visualized: yes  Post-procedure assessment: chest rise, ETCO2 monitor, CO2 detector and esophageal detector  Breath sounds: clear  Cuff inflated: yes  ETT to lip: 24 cm  ETT to teeth: 23 cm  Tube secured with: ETT wood  Chest x-ray interpreted by me.  Chest x-ray findings: endotracheal tube in appropriate position  Patient tolerance: Patient tolerated the procedure well with no immediate complications      Critical Care    Date/Time: 12/30/2022 5:19 AM  Performed by: Evelyn Hartmann Do, MD  Authorized by: Evelyn Hartmann Do, MD   Direct patient critical care time: 15 minutes  Ordering / reviewing critical care time: 5 minutes  Documentation critical care time: 5 minutes  Consulting other physicians critical care time: 5  "minutes  Other critical care time: 5 minutes  Total critical care time (exclusive of procedural time) : 35 minutes  Critical care time was exclusive of separately billable procedures and treating other patients and teaching time.  Critical care was necessary to treat or prevent imminent or life-threatening deterioration of the following conditions: acute psychosis and agitation.  Critical care was time spent personally by me on the following activities: blood draw for specimens, development of treatment plan with patient or surrogate, discussions with consultants, interpretation of cardiac output measurements, evaluation of patient's response to treatment, examination of patient, obtaining history from patient or surrogate, ordering and performing treatments and interventions, ordering and review of laboratory studies, ordering and review of radiographic studies, pulse oximetry, re-evaluation of patient's condition and review of old charts.      ED Vital Signs:  Vitals:    12/29/22 2229 12/29/22 2232 12/30/22 0202 12/30/22 0436   BP: 126/81 126/81 111/78    Pulse: (!) 125 (!) 125 (!) 129 71   Resp: (!) 22 (!) 22  18   Temp: 97.9 °F (36.6 °C) 97.9 °F (36.6 °C)     TempSrc: Oral Oral     SpO2:  100%  100%   Weight:  61.2 kg (135 lb)     Height:  5' 6" (1.676 m)         Abnormal Lab Results:  Labs Reviewed   CBC W/ AUTO DIFFERENTIAL - Abnormal; Notable for the following components:       Result Value    WBC 18.38 (*)     Hemoglobin 13.9 (*)     Gran # (ANC) 13.4 (*)     Immature Grans (Abs) 0.05 (*)     Mono # 1.3 (*)     All other components within normal limits   COMPREHENSIVE METABOLIC PANEL - Abnormal; Notable for the following components:    CO2 18 (*)     Glucose 111 (*)     BUN 28 (*)     Total Protein 8.8 (*)     ALT 8 (*)     All other components within normal limits   DRUG SCREEN PANEL, URINE EMERGENCY - Abnormal; Notable for the following components:    Benzodiazepines Presumptive Positive (*)     " Amphetamine Screen, Ur Presumptive Positive (*)     THC Presumptive Positive (*)     All other components within normal limits    Narrative:     Specimen Source->Urine   URINALYSIS, REFLEX TO URINE CULTURE - Abnormal; Notable for the following components:    pH, UA >8.0 (*)     Protein, UA Trace (*)     All other components within normal limits    Narrative:     Specimen Source->Urine   CK - Abnormal; Notable for the following components:     (*)     All other components within normal limits   LACTIC ACID, PLASMA - Abnormal; Notable for the following components:    Lactate (Lactic Acid) 3.6 (*)     All other components within normal limits    Narrative:        LA critical result(s) called and verbal readback obtained from   Cheikh Dee RN ED  by CD9 12/30/2022 02:38   TSH - Abnormal; Notable for the following components:    TSH 0.347 (*)     All other components within normal limits   CK - Abnormal; Notable for the following components:    CPK 1931 (*)     All other components within normal limits   ISTAT PROCEDURE - Abnormal; Notable for the following components:    POC PH 7.331 (*)     POC PO2 237 (*)     POC HCO3 21.1 (*)     All other components within normal limits   CULTURE, BLOOD   CULTURE, BLOOD   ALCOHOL,MEDICAL (ETHANOL)   CK   URINALYSIS, REFLEX TO URINE CULTURE   SARS-COV-2 RNA AMPLIFICATION, QUAL   TROPONIN I   B-TYPE NATRIURETIC PEPTIDE   T4, FREE   LACTIC ACID, PLASMA        All Lab Results:  Results for orders placed or performed during the hospital encounter of 12/29/22   CBC auto differential   Result Value Ref Range    WBC 18.38 (H) 3.90 - 12.70 K/uL    RBC 4.77 4.60 - 6.20 M/uL    Hemoglobin 13.9 (L) 14.0 - 18.0 g/dL    Hematocrit 42.0 40.0 - 54.0 %    MCV 88 82 - 98 fL    MCH 29.1 27.0 - 31.0 pg    MCHC 33.1 32.0 - 36.0 g/dL    RDW 12.7 11.5 - 14.5 %    Platelets 373 150 - 450 K/uL    MPV 10.1 9.2 - 12.9 fL    Immature Granulocytes 0.3 0.0 - 0.5 %    Gran # (ANC) 13.4 (H) 1.8 - 7.7 K/uL     Immature Grans (Abs) 0.05 (H) 0.00 - 0.04 K/uL    Lymph # 3.5 1.0 - 4.8 K/uL    Mono # 1.3 (H) 0.3 - 1.0 K/uL    Eos # 0.1 0.0 - 0.5 K/uL    Baso # 0.07 0.00 - 0.20 K/uL    nRBC 0 0 /100 WBC    Gran % 72.9 38.0 - 73.0 %    Lymph % 18.9 18.0 - 48.0 %    Mono % 6.8 4.0 - 15.0 %    Eosinophil % 0.7 0.0 - 8.0 %    Basophil % 0.4 0.0 - 1.9 %    Differential Method Automated    Comprehensive metabolic panel   Result Value Ref Range    Sodium 137 136 - 145 mmol/L    Potassium 4.1 3.5 - 5.1 mmol/L    Chloride 103 95 - 110 mmol/L    CO2 18 (L) 23 - 29 mmol/L    Glucose 111 (H) 70 - 110 mg/dL    BUN 28 (H) 6 - 20 mg/dL    Creatinine 0.9 0.5 - 1.4 mg/dL    Calcium 10.0 8.7 - 10.5 mg/dL    Total Protein 8.8 (H) 6.0 - 8.4 g/dL    Albumin 4.0 3.5 - 5.2 g/dL    Total Bilirubin 0.4 0.1 - 1.0 mg/dL    Alkaline Phosphatase 90 55 - 135 U/L    AST 22 10 - 40 U/L    ALT 8 (L) 10 - 44 U/L    Anion Gap 16 8 - 16 mmol/L    eGFR >60 >60 mL/min/1.73 m^2   Ethanol   Result Value Ref Range    Alcohol, Serum <10 <10 mg/dL   Drug screen panel, emergency   Result Value Ref Range    Benzodiazepines Presumptive Positive (A) Negative    Methadone metabolites Negative Negative    Cocaine (Metab.) Negative Negative    Opiate Scrn, Ur Negative Negative    Barbiturate Screen, Ur Negative Negative    Amphetamine Screen, Ur Presumptive Positive (A) Negative    THC Presumptive Positive (A) Negative    Phencyclidine Negative Negative    Creatinine, Urine 54.3 23.0 - 375.0 mg/dL    Toxicology Information SEE COMMENT    COVID-19 Rapid Screening   Result Value Ref Range    SARS-CoV-2 RNA, Amplification, Qual Negative Negative   Urinalysis, Reflex to Urine Culture   Result Value Ref Range    Specimen UA Urine, Clean Catch     Color, UA Yellow Yellow, Straw, Tatyana    Appearance, UA Clear Clear    pH, UA >8.0 (A) 5.0 - 8.0    Specific Gravity, UA 1.020 1.005 - 1.030    Protein, UA Trace (A) Negative    Glucose, UA Negative Negative    Ketones, UA Negative  Negative    Bilirubin (UA) Negative Negative    Occult Blood UA Negative Negative    Nitrite, UA Negative Negative    Urobilinogen, UA Negative <2.0 EU/dL    Leukocytes, UA Negative Negative   CK   Result Value Ref Range     (H) 20 - 200 U/L   Lactic acid, plasma   Result Value Ref Range    Lactate (Lactic Acid) 3.6 (HH) 0.5 - 2.2 mmol/L   Troponin I   Result Value Ref Range    Troponin I 0.013 0.000 - 0.026 ng/mL   Brain natriuretic peptide   Result Value Ref Range    BNP 36 0 - 99 pg/mL   TSH   Result Value Ref Range    TSH 0.347 (L) 0.400 - 4.000 uIU/mL   CPK   Result Value Ref Range    CPK 1931 (H) 20 - 200 U/L   T4, Free   Result Value Ref Range    Free T4 1.01 0.71 - 1.51 ng/dL   ISTAT PROCEDURE   Result Value Ref Range    POC PH 7.331 (L) 7.35 - 7.45    POC PCO2 39.9 35 - 45 mmHg    POC PO2 237 (H) 80 - 100 mmHg    POC HCO3 21.1 (L) 24 - 28 mmol/L    POC BE -5 -2 to 2 mmol/L    POC SATURATED O2 100 95 - 100 %    Rate 18     Sample ARTERIAL     Site RR     Allens Test Pass     DelSys Adult Vent     Mode AC/PRVC     Vt 450     PEEP 5     FiO2 50          Imaging Results:  Imaging Results              CT Head Without Contrast (In process)                      X-Ray Chest AP Portable (In process)                             The Emergency Provider reviewed the vital signs and test results, which are outlined above.    ED Discussion     2:00 AM: Dr. Barnett transfers care of patient to Dr. Ovalle pending lab and radiology results.    5:52 AM: Discussed case with Dr. Ky Palomares (Hospital Medicine) and MELISSA TelloBC (Intensive Care) . Dr. Tejada agrees with current care and management of pt and accepts admission.   Admitting Service: Intensive Care  Admitting Physician: Dr. Tejada  Admit to: ICU    5:53 AM: Re-evaluated pt. I have discussed test results, shared treatment plan, and the need for admission with patient and family at bedside. Pt and family express understanding at this time and agree  with all information. All questions answered. Pt and family have no further questions or concerns at this time. Pt is ready for admit.        ED Course as of 12/30/22 0557   Thu Dec 29, 2022   2352 Patient has received 5 of Haldol, 2 of Ativan and 50 of Benadryl and patient still combative.  In restraints.  Removed his IV.  Still cursing.  Needs more sedation.  2 more of IM Ativan ordered.  CPK pending [DP]   Fri Dec 30, 2022   0122 1:18 AM:  There is no documented psychiatric history with patient and chart review.  Spoke on the phone with patient's son Brandon.  He reports that patient was last seen normal earlier this morning.  When he came home from work today patient was altered.  He reported a temperature of 102°. Patient was saying that he could not breathe.  Son also states that patient drinks liquor daily.  Son denies any known drug use. [DP]   0122 Patient has received 4 of Ativan here plus the Versed EN route and still agitated.  With additional history from the son, will treat for presumed alcohol withdrawal delirium tremens.  He pulled out his IV.  Will start another IV and give more benzos and Precedex.  Additional labs and imaging ordered [DP]   0137 Patient requiring multiple staff members to restrain patient to treat with benzodiazepines.  An IV was started, but patient is thrashing about an IV was lost.  4 mg of IV Ativan was given prior to losing the IV.  No affect.  Patient still very agitated requiring higher doses.  Will give another 4 mg IM at this time and will attempt to restart another IV [DP]      ED Course User Index  [DP] Elio Barnett MD       ED Medication(s):  Medications   dexmedetomidine (PRECEDEX) 400mcg/100mL dextrose 5% infusion (0.6 mcg/kg/hr × 61.2 kg Intravenous Verify Only 12/30/22 0256)   vancomycin - pharmacy to dose (has no administration in time range)   vancomycin 1,500 mg in dextrose 5 % 250 mL IVPB (1,500 mg Intravenous Trough Due As Scheduled Before Dose 12/31/22  1500)   vancomycin (VANCOCIN) 1,000 mg in dextrose 5 % 250 mL IVPB (has no administration in time range)   etomidate injection 20 mg (has no administration in time range)   succinylcholine injection 120 mg (has no administration in time range)   sodium chloride 0.9% flush 10 mL (has no administration in time range)   ondansetron injection 4 mg (has no administration in time range)   acetaminophen tablet 650 mg (has no administration in time range)   LORazepam injection 2 mg (2 mg Intramuscular Given 12/29/22 2240)   diphenhydrAMINE injection 50 mg (50 mg Intramuscular Given 12/29/22 2240)   haloperidol lactate injection 5 mg (5 mg Intramuscular Given 12/29/22 2243)   LORazepam injection 2 mg (2 mg Intramuscular Given 12/29/22 2357)   LORazepam injection 4 mg (4 mg Intravenous Given 12/30/22 0205)   LORazepam injection 4 mg (4 mg Intramuscular Given 12/30/22 0205)   sodium chloride 0.9% bolus 1,000 mL 1,000 mL (0 mLs Intravenous Stopped 12/30/22 0537)   ziprasidone injection 20 mg (20 mg Intramuscular Given 12/30/22 0159)   LORazepam injection 2 mg (2 mg Intravenous Given 12/30/22 0415)   sodium chloride 0.9% bolus 1,000 mL 1,000 mL (0 mLs Intravenous Stopped 12/30/22 0538)   propofol (DIPRIVAN) 10 mg/mL infusion (20 mcg/kg/min × 61.2 kg Intravenous New Bag 12/30/22 0437)     New Prescriptions    No medications on file             Medical Decision Making    Medical Decision Making:   Clinical Tests:   Lab Tests: Ordered and Reviewed  Radiological Study: Ordered and Reviewed         Scribe Attestation:   Scribe #1: I performed the above scribed service and the documentation accurately describes the services I performed. I attest to the accuracy of the note.    Attending:   Physician Attestation Statement for Scribe #1: I, Elio Barnett MD, personally performed the services described in this documentation, as scribed by Ana M Lind/Lori Izquierdo, in my presence, and it is both accurate and complete.       Scribe  Attestation:   Scribe #2: I performed the above scribed service and the documentation accurately describes the services I performed. I attest to the accuracy of the note.    Attending Attestation:           Physician Attestation for Scribe:    Physician Attestation Statement for Scribe #2: I, Evelyn Hartmann Do, MD, reviewed documentation, as scribed by Marion Dean in my presence, and it is both accurate and complete. I also acknowledge and confirm the content of the note done by Ramae #1.        Clinical Impression       ICD-10-CM ICD-9-CM   1. Acute psychosis  F23 298.9   2. Non-traumatic rhabdomyolysis  M62.82 728.88   3. 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

## 2022-12-30 NOTE — ASSESSMENT & PLAN NOTE
"Family reports tobacco, ETOH, heroin use; UDS positive THC and amphetamine  Report was "detoxing from heroin"; if desires cessation post extubation and amenable - consider inpt psych/drug detox program once medically clear for hospital discharge    "

## 2022-12-30 NOTE — HPI
52 year old male with known h/o heroin use and daily ETOH along with every day tobacco smoker    Presents to ED via EMS called when found altered saying that he could not breathe and febrile (102) by son; last seen normal in morning  Arrived combative, screaming, cursing  Attempted sedation with ativan total 14 mg; benadryl; haldol; zyprexa; and precedex. Ultimately required intubation for sedation    Labs revealed leukocytosis 18k with negative UA, CXR, and CT head  Lactate 3.6; CPK trending up 351>1931; CO2 18  UDS positive benzo (given prior to screen) along with THC and amphetamine    Critical care team contacted for admission for ongoing management and mechanical vent support  Seen and examined in ED

## 2022-12-30 NOTE — ASSESSMENT & PLAN NOTE
Differential includes ETOH withdrawal delirium and amphetamine toxicity (most likely given fever and rhabdo) along with less likely infectious etiology  CT head without acute findings  Sedation for control of hyperactive psychosis and prevention of exacerbation of rhabdo  Daily and prn SAT for evaluation of extubation readiness and neuro exam

## 2022-12-30 NOTE — PLAN OF CARE
Swer contacted patients emergency contact, Rob Kaur, to verify demographics. Patient's emergency contact did answer, Swer introduced herself over the phone. Once Swer introduced herself emergency contact hung up.     Patient is currently intubated and unable to verify demographics. Swer will continue to monitor and provide assistance as needed.

## 2022-12-30 NOTE — H&P
[facility] - Emergency Dept.  Critical Care Medicine  History & Physical    Patient Name: Lucas Bernal II  MRN: [id number]  Admission Date: 12/29/2022  Hospital Length of Stay: 0 days  Code Status: Full Code  Attending Physician: Victoriano Tejada MD   Primary Care Provider: Primary Doctor No   Principal Problem: Acute psychosis    Subjective:     HPI:  52 year old male with known h/o heroin use and daily ETOH along with every day tobacco smoker    Presents to ED via EMS called when found altered saying that he could not breathe and febrile (102) by son; last seen normal in morning  Arrived combative, screaming, cursing  Attempted sedation with ativan total 14 mg; benadryl; haldol; zyprexa; and precedex. Ultimately required intubation for sedation    Labs revealed leukocytosis 18k with negative UA, CXR, and CT head  Lactate 3.6; CPK trending up 351>1931; CO2 18  UDS positive benzo (given prior to screen) along with THC and amphetamine    Critical care team contacted for admission for ongoing management and mechanical vent support  Seen and examined in ED       Hospital/ICU Course:  No notes on file     History reviewed. No pertinent past medical history.    Past Surgical History:   Procedure Laterality Date    HERNIA REPAIR         Review of patient's allergies indicates:   Allergen Reactions    Pcn [penicillins]        Family History    None       Tobacco Use    Smoking status: Every Day     Packs/day: 0.50     Types: Cigarettes    Smokeless tobacco: Not on file   Substance and Sexual Activity    Alcohol use: Yes     Comment: daily etoh per son; amount unknown    Drug use: Yes     Types: Amphetamines, Heroin, Marijuana    Sexual activity: Not on file         Review of Systems   Unable to perform ROS: Intubated   Objective:     Vital Signs (Most Recent):  Temp: 97.9 °F (36.6 °C) (12/29/22 2232)  Pulse: 67 (12/30/22 0602)  Resp: 18 (12/30/22 0602)  BP: (!) 145/94 (12/30/22 0602)  SpO2: 99 % (12/30/22 0602)    Vital Signs (24h Range):  Temp:  [97.9 °F (36.6 °C)] 97.9 °F (36.6 °C)  Pulse:  [] 67  Resp:  [18-27] 18  SpO2:  [99 %-100 %] 99 %  BP: (111-162)/(78-98) 145/94     Weight: 61.2 kg (135 lb)  Body mass index is 21.79 kg/m².      Intake/Output Summary (Last 24 hours) at 12/30/2022 0612  Last data filed at 12/30/2022 0603  Gross per 24 hour   Intake 1229.08 ml   Output --   Net 1229.08 ml      dexmedetomidine (PRECEDEX) infusion 0.8 mcg/kg/hr (12/30/22 0625)    lactated ringers      propofoL 30 mcg/kg/min (12/30/22 0623)       Physical Exam  Vitals and nursing note reviewed.   Constitutional:       Appearance: He is underweight. He is ill-appearing.      Interventions: He is sedated, intubated and restrained.   HENT:      Head: Atraumatic.   Eyes:      Conjunctiva/sclera: Conjunctivae normal.   Neck:      Vascular: No JVD.   Cardiovascular:      Rate and Rhythm: Normal rate and regular rhythm.      Pulses:           Radial pulses are 2+ on the right side and 2+ on the left side.        Dorsalis pedis pulses are 2+ on the right side and 2+ on the left side.      Heart sounds: No murmur heard.  Pulmonary:      Effort: He is intubated.      Breath sounds: Normal breath sounds.   Abdominal:      General: Bowel sounds are normal. There is no distension.      Palpations: Abdomen is soft.   Musculoskeletal:      Right lower leg: No edema.      Left lower leg: No edema.   Skin:     General: Skin is warm and dry.      Capillary Refill: Capillary refill takes less than 2 seconds.      Findings: Abrasion (scattered, mild to bilat lower extremities) present.       Vents:  Vent Mode: A/C (12/30/22 0436)  Ventilator Initiated: Yes (12/30/22 0436)  Set Rate: 18 BPM (12/30/22 0436)  Vt Set: 450 mL (12/30/22 0436)  PEEP/CPAP: 5 cmH20 (12/30/22 0436)  Oxygen Concentration (%): (S) 30 (12/30/22 1398)  Peak Airway Pressure: 20 cmH20 (12/30/22 0436)  Plateau Pressure: 10 cmH20 (12/30/22 0436)  Total Ve: 8.8 L/m (12/30/22  "0436)  F/VT Ratio<105 (RSBI): (!) 36.59 (12/30/22 0436)    Lines/Drains/Airways       Drain  Duration                  NG/OG Tube 12/30/22 0556 Right mouth <1 day         Urethral Catheter 12/30/22 0557 16 Fr. <1 day              Airway  Duration                  Airway - Non-Surgical 12/30/22 0430 Endotracheal Tube <1 day              Peripheral Intravenous Line  Duration                  Peripheral IV - Single Lumen 12/30/22 0136 18 G Anterior;Left Lower leg <1 day         Peripheral IV - Single Lumen 12/30/22 0559 18 G Anterior;Distal;Left Forearm <1 day         Peripheral IV - Single Lumen 12/30/22 0601 18 G Anterior;Left;Proximal Forearm <1 day                    Significant Labs:    CBC/Anemia Profile:  Recent Labs   Lab 12/29/22 2302   WBC 18.38*   HGB 13.9*   HCT 42.0      MCV 88   RDW 12.7        Chemistries:  Recent Labs   Lab 12/29/22  2302      K 4.1      CO2 18*   BUN 28*   CREATININE 0.9   CALCIUM 10.0   ALBUMIN 4.0   PROT 8.8*   BILITOT 0.4   ALKPHOS 90   ALT 8*   AST 22       Lactic Acid:   Recent Labs   Lab 12/30/22  0203 12/30/22  0538   LACTATE 3.6* 1.4     All pertinent labs within the past 24 hours have been reviewed.    Significant Imaging:   I have reviewed all pertinent imaging results/findings within the past 24 hours.    Assessment/Plan:     Psychiatric  * Acute psychosis  Differential includes ETOH withdrawal delirium and amphetamine toxicity (most likely given fever and rhabdo) along with less likely infectious etiology  CT head without acute findings  Sedation for control of hyperactive psychosis and prevention of exacerbation of rhabdo  Daily and prn SAT for evaluation of extubation readiness and neuro exam    Polysubstance abuse  Family reports tobacco, ETOH, heroin use; UDS positive THC and amphetamine  Report was "detoxing from heroin"; if desires cessation post extubation and amenable - consider inpt psych/drug detox program once medically clear for hospital " discharge      Pulmonary  On mechanically assisted ventilation  Intubated for airway protection to allow adequate sedation  Vent settings reviewed and adjusted to optimize gas exchange  Daily and prn SAT with SBT for extubation when tolerates sedation vacation  VAP prophylaxis    Oncology  Leukocytosis  Suspect reactive but given psychosis and fever, broad spectrum abx initiated after cultures sent  Continue abx until infectious etiology ruled out  Monitor temp, wbc, and culture data    Orthopedic  Non-traumatic rhabdomyolysis  S/t hyperactive psychosis; suspect amphetamine related  Sedation   IV hydration  Monitor CPK        Critical Care Daily Checklist:    A: Awake: RASS Goal/Actual Goal:    Actual:     B: Spontaneous Breathing Trial Performed?     C: SAT & SBT Coordinated?  Not candidate                      D: Delirium: CAM-ICU     E: Early Mobility Performed? Yes   F: Feeding Goal:    Status:     Current Diet Order   Procedures    Diet NPO      AS: Analgesia/Sedation Propofol, precedex   T: Thromboembolic Prophylaxis lovenox   H: HOB > 300 Yes   U: Stress Ulcer Prophylaxis (if needed) pepcid   G: Glucose Control monitor   B: Bowel Function     I: Indwelling Catheter (Lines & Torres) Necessity reviewed   D: De-escalation of Antimicrobials/Pharmacotherapies reviewed    Plan for the day/ETD As above    Code Status:  Family/Goals of Care: Full Code  Home vs inpt psych/detox on discharge     Critical Care Time: 45 minutes  Critical secondary to mechanical ventilation, sedation for acute psychosis     Critical care was time spent personally by me on the following activities: development of treatment plan with patient or surrogate and bedside caregivers, discussions with consultants, evaluation of patient's response to treatment, examination of patient, ordering and performing treatments and interventions, ordering and review of laboratory studies, ordering and review of radiographic studies, pulse oximetry,  re-evaluation of patient's condition. This critical care time did not overlap with that of any other provider or involve time for any procedures.     GIBSON Tello-BC  Critical Care Medicine  [facility] - Emergency Dept.

## 2022-12-30 NOTE — ASSESSMENT & PLAN NOTE
Intubated for airway protection to allow adequate sedation  Vent settings reviewed and adjusted to optimize gas exchange  Daily and prn SAT with SBT for extubation when tolerates sedation vacation  VAP prophylaxis

## 2022-12-30 NOTE — PROGRESS NOTES
Patient admitted to room 8 from ED. Patient remains intubated with precedex and propofol continued for an ordered RASS goal of -2. PIO TAYLOR.

## 2022-12-30 NOTE — PLAN OF CARE
POC reviewed with son, shree. Propofol and Fentanyl continued to maintain a RASS goal of -2. Patient remains intubated, see vent flowsheet. NSR-SB on telemetry. VSS. OG remains clamped and in place. Torres catheter remains in place and patent. Patient turned every 2 hours with heel boots in place

## 2022-12-30 NOTE — ASSESSMENT & PLAN NOTE
Suspect reactive but given psychosis and fever, broad spectrum abx initiated after cultures sent  Continue abx until infectious etiology ruled out  Monitor temp, wbc, and culture data

## 2022-12-31 LAB
ALLENS TEST: ABNORMAL
ANION GAP SERPL CALC-SCNC: 10 MMOL/L (ref 8–16)
BASOPHILS # BLD AUTO: 0.06 K/UL (ref 0–0.2)
BASOPHILS NFR BLD: 0.5 % (ref 0–1.9)
BUN SERPL-MCNC: 12 MG/DL (ref 6–20)
CALCIUM SERPL-MCNC: 8.7 MG/DL (ref 8.7–10.5)
CHLORIDE SERPL-SCNC: 112 MMOL/L (ref 95–110)
CK SERPL-CCNC: 1415 U/L (ref 20–200)
CO2 SERPL-SCNC: 18 MMOL/L (ref 23–29)
CREAT SERPL-MCNC: 0.7 MG/DL (ref 0.5–1.4)
DELSYS: ABNORMAL
DIFFERENTIAL METHOD: ABNORMAL
EOSINOPHIL # BLD AUTO: 0.3 K/UL (ref 0–0.5)
EOSINOPHIL NFR BLD: 2.4 % (ref 0–8)
ERYTHROCYTE [DISTWIDTH] IN BLOOD BY AUTOMATED COUNT: 13.4 % (ref 11.5–14.5)
ERYTHROCYTE [SEDIMENTATION RATE] IN BLOOD BY WESTERGREN METHOD: 18 MM/H
EST. GFR  (NO RACE VARIABLE): >60 ML/MIN/1.73 M^2
FIO2: 30
GLUCOSE SERPL-MCNC: 80 MG/DL (ref 70–110)
HCO3 UR-SCNC: 21.8 MMOL/L (ref 24–28)
HCT VFR BLD AUTO: 33.8 % (ref 40–54)
HGB BLD-MCNC: 10.8 G/DL (ref 14–18)
IMM GRANULOCYTES # BLD AUTO: 0.04 K/UL (ref 0–0.04)
IMM GRANULOCYTES NFR BLD AUTO: 0.3 % (ref 0–0.5)
LYMPHOCYTES # BLD AUTO: 2.5 K/UL (ref 1–4.8)
LYMPHOCYTES NFR BLD: 20.5 % (ref 18–48)
MCH RBC QN AUTO: 28.6 PG (ref 27–31)
MCHC RBC AUTO-ENTMCNC: 32 G/DL (ref 32–36)
MCV RBC AUTO: 90 FL (ref 82–98)
MODE: ABNORMAL
MONOCYTES # BLD AUTO: 0.9 K/UL (ref 0.3–1)
MONOCYTES NFR BLD: 7.2 % (ref 4–15)
NEUTROPHILS # BLD AUTO: 8.3 K/UL (ref 1.8–7.7)
NEUTROPHILS NFR BLD: 69.1 % (ref 38–73)
NRBC BLD-RTO: 0 /100 WBC
PCO2 BLDA: 34.6 MMHG (ref 35–45)
PEEP: 5
PH SMN: 7.41 [PH] (ref 7.35–7.45)
PLATELET # BLD AUTO: 248 K/UL (ref 150–450)
PMV BLD AUTO: 10.7 FL (ref 9.2–12.9)
PO2 BLDA: 83 MMHG (ref 80–100)
POC BE: -3 MMOL/L
POC SATURATED O2: 96 % (ref 95–100)
POCT GLUCOSE: 83 MG/DL (ref 70–110)
POTASSIUM SERPL-SCNC: 3.6 MMOL/L (ref 3.5–5.1)
RBC # BLD AUTO: 3.77 M/UL (ref 4.6–6.2)
SAMPLE: ABNORMAL
SITE: ABNORMAL
SODIUM SERPL-SCNC: 140 MMOL/L (ref 136–145)
TRIGL SERPL-MCNC: 128 MG/DL (ref 30–150)
VT: 450
WBC # BLD AUTO: 12.02 K/UL (ref 3.9–12.7)

## 2022-12-31 PROCEDURE — 82550 ASSAY OF CK (CPK): CPT | Performed by: NURSE PRACTITIONER

## 2022-12-31 PROCEDURE — 63600175 PHARM REV CODE 636 W HCPCS: Performed by: NURSE PRACTITIONER

## 2022-12-31 PROCEDURE — 25000003 PHARM REV CODE 250: Performed by: NURSE PRACTITIONER

## 2022-12-31 PROCEDURE — 27000221 HC OXYGEN, UP TO 24 HOURS

## 2022-12-31 PROCEDURE — 84478 ASSAY OF TRIGLYCERIDES: CPT | Performed by: INTERNAL MEDICINE

## 2022-12-31 PROCEDURE — 25000003 PHARM REV CODE 250: Performed by: EMERGENCY MEDICINE

## 2022-12-31 PROCEDURE — 80048 BASIC METABOLIC PNL TOTAL CA: CPT | Performed by: NURSE PRACTITIONER

## 2022-12-31 PROCEDURE — 36415 COLL VENOUS BLD VENIPUNCTURE: CPT | Performed by: NURSE PRACTITIONER

## 2022-12-31 PROCEDURE — 99900035 HC TECH TIME PER 15 MIN (STAT)

## 2022-12-31 PROCEDURE — 85025 COMPLETE CBC W/AUTO DIFF WBC: CPT | Performed by: NURSE PRACTITIONER

## 2022-12-31 PROCEDURE — 94761 N-INVAS EAR/PLS OXIMETRY MLT: CPT

## 2022-12-31 PROCEDURE — 99900026 HC AIRWAY MAINTENANCE (STAT)

## 2022-12-31 PROCEDURE — 25000003 PHARM REV CODE 250: Performed by: INTERNAL MEDICINE

## 2022-12-31 PROCEDURE — 36600 WITHDRAWAL OF ARTERIAL BLOOD: CPT

## 2022-12-31 PROCEDURE — 82803 BLOOD GASES ANY COMBINATION: CPT

## 2022-12-31 PROCEDURE — 20000000 HC ICU ROOM

## 2022-12-31 PROCEDURE — 94003 VENT MGMT INPAT SUBQ DAY: CPT

## 2022-12-31 PROCEDURE — 63600175 PHARM REV CODE 636 W HCPCS: Performed by: EMERGENCY MEDICINE

## 2022-12-31 RX ORDER — DIAZEPAM 5 MG/1
5 TABLET ORAL ONCE
Status: COMPLETED | OUTPATIENT
Start: 2022-12-31 | End: 2022-12-31

## 2022-12-31 RX ORDER — FOLIC ACID 1 MG/1
1 TABLET ORAL DAILY
Status: DISCONTINUED | OUTPATIENT
Start: 2022-12-31 | End: 2023-01-02 | Stop reason: HOSPADM

## 2022-12-31 RX ORDER — FENTANYL CITRATE-0.9 % NACL/PF 10 MCG/ML
0-200 PLASTIC BAG, INJECTION (ML) INTRAVENOUS CONTINUOUS
Status: DISCONTINUED | OUTPATIENT
Start: 2022-12-31 | End: 2023-01-01

## 2022-12-31 RX ORDER — THIAMINE HCL 100 MG
100 TABLET ORAL DAILY
Status: DISCONTINUED | OUTPATIENT
Start: 2022-12-31 | End: 2023-01-02 | Stop reason: HOSPADM

## 2022-12-31 RX ORDER — DIAZEPAM 5 MG/1
5 TABLET ORAL
Status: DISCONTINUED | OUTPATIENT
Start: 2022-12-31 | End: 2023-01-02

## 2022-12-31 RX ADMIN — SODIUM CHLORIDE, POTASSIUM CHLORIDE, SODIUM LACTATE AND CALCIUM CHLORIDE: 600; 310; 30; 20 INJECTION, SOLUTION INTRAVENOUS at 11:12

## 2022-12-31 RX ADMIN — FAMOTIDINE 20 MG: 10 INJECTION, SOLUTION INTRAVENOUS at 08:12

## 2022-12-31 RX ADMIN — ENOXAPARIN SODIUM 40 MG: 40 INJECTION SUBCUTANEOUS at 04:12

## 2022-12-31 RX ADMIN — MUPIROCIN: 20 OINTMENT TOPICAL at 08:12

## 2022-12-31 RX ADMIN — FENTANYL CITRATE 50 MCG: 50 INJECTION INTRAMUSCULAR; INTRAVENOUS at 04:12

## 2022-12-31 RX ADMIN — PROPOFOL 50 MCG/KG/MIN: 10 INJECTION, EMULSION INTRAVENOUS at 08:12

## 2022-12-31 RX ADMIN — Medication 250 MCG/HR: at 10:12

## 2022-12-31 RX ADMIN — PROPOFOL 50 MCG/KG/MIN: 10 INJECTION, EMULSION INTRAVENOUS at 10:12

## 2022-12-31 RX ADMIN — PROPOFOL 50 MCG/KG/MIN: 10 INJECTION, EMULSION INTRAVENOUS at 01:12

## 2022-12-31 RX ADMIN — FOLIC ACID 1 MG: 1 TABLET ORAL at 08:12

## 2022-12-31 RX ADMIN — Medication 100 MG: at 08:12

## 2022-12-31 RX ADMIN — VANCOMYCIN HYDROCHLORIDE 1000 MG: 1 INJECTION, POWDER, LYOPHILIZED, FOR SOLUTION INTRAVENOUS at 05:12

## 2022-12-31 RX ADMIN — DIAZEPAM 5 MG: 5 TABLET ORAL at 08:12

## 2022-12-31 RX ADMIN — Medication 250 MCG/HR: at 06:12

## 2022-12-31 RX ADMIN — Medication 250 MCG/HR: at 12:12

## 2022-12-31 RX ADMIN — CHLORHEXIDINE GLUCONATE 0.12% ORAL RINSE 15 ML: 1.2 LIQUID ORAL at 08:12

## 2022-12-31 RX ADMIN — DIAZEPAM 5 MG: 5 TABLET ORAL at 01:12

## 2022-12-31 RX ADMIN — SODIUM CHLORIDE, POTASSIUM CHLORIDE, SODIUM LACTATE AND CALCIUM CHLORIDE: 600; 310; 30; 20 INJECTION, SOLUTION INTRAVENOUS at 09:12

## 2022-12-31 RX ADMIN — FENTANYL CITRATE 50 MCG: 50 INJECTION INTRAMUSCULAR; INTRAVENOUS at 06:12

## 2022-12-31 RX ADMIN — SODIUM CHLORIDE, POTASSIUM CHLORIDE, SODIUM LACTATE AND CALCIUM CHLORIDE: 600; 310; 30; 20 INJECTION, SOLUTION INTRAVENOUS at 02:12

## 2022-12-31 RX ADMIN — PROPOFOL 50 MCG/KG/MIN: 10 INJECTION, EMULSION INTRAVENOUS at 04:12

## 2022-12-31 RX ADMIN — PROPOFOL 35 MCG/KG/MIN: 10 INJECTION, EMULSION INTRAVENOUS at 02:12

## 2022-12-31 RX ADMIN — FENTANYL CITRATE 50 MCG: 50 INJECTION INTRAMUSCULAR; INTRAVENOUS at 09:12

## 2022-12-31 NOTE — ASSESSMENT & PLAN NOTE
S/t hyperactive psychosis; suspect amphetamine related  Sedation   CPK down trending, renal function preserved with good urine output  Decreased IVFs   Follow labs

## 2022-12-31 NOTE — HOSPITAL COURSE
12/31- ongoing agitation overnight despite significant sedation- not redirectable or following commands and fentanyl was increased; CPK down trending, renal function normal with good urine output, no fevers     1/1- ongoing agitation yesterday when sedation lowered; valium added. SAT/SBT today   01/02/2023 events noted.  Verbally abusive.  On Precedex drip afebrile O2 sat 100% urine output 900 mL extubated yesterday  1/2 patient a and O x3.   contacted by RN.  Father will  patient and will  medicines from pharmacy

## 2022-12-31 NOTE — PROGRESS NOTES
Therapy with vancomycin complete and/or consult discontinued by provider.  Pharmacy will sign off, please re-consult as needed.    Thank you for allowing us to participate in this patient's care.   Katherine McArdle, Pharm.D. 12/31/2022 10:01 AM

## 2022-12-31 NOTE — PLAN OF CARE
VS stable throughout shift. OETT to vent at documenting settings. NSR; normotensive. Torres in place; good UOP. Bilateral soft wrist restraints with current order; no injuries noted. Pt sedated via fent and propofol drips, see MAR. Attempts at weaning sedation unsuccessful; pt becomes very agitated, uncooperative and thrashes anytime he's stimulated or wakened. Pt's contact updated on status and plan of care.

## 2022-12-31 NOTE — ASSESSMENT & PLAN NOTE
"Family reports tobacco, ETOH, heroin use; UDS positive THC and amphetamine  Thiamine and folic acid   Report was "detoxing from heroin"; if desires cessation post extubation and amenable - inpt psych/drug detox program would be appropriate once medically stable    "

## 2022-12-31 NOTE — ASSESSMENT & PLAN NOTE
Suspect polysubstance use - daily ETOH + amphetamine use  CT head without acute findings  Sedation for control of hyperactive psychosis and prevention of exacerbation of rhabdo  Adding a dose of PO valium - may schedule benzo   Daily and prn SAT for evaluation of extubation readiness and neuro exam

## 2022-12-31 NOTE — SUBJECTIVE & OBJECTIVE
Objective:     Vital Signs (Most Recent):  Temp: 99 °F (37.2 °C) (12/31/22 0701)  Pulse: 74 (12/31/22 0701)  Resp: 18 (12/31/22 0701)  BP: 103/61 (12/31/22 0701)  SpO2: 97 % (12/31/22 0701) Vital Signs (24h Range):  Temp:  [97.2 °F (36.2 °C)-99 °F (37.2 °C)] 99 °F (37.2 °C)  Pulse:  [] 74  Resp:  [0-36] 18  SpO2:  [93 %-100 %] 97 %  BP: ()/(55-98) 103/61     Weight: 50.4 kg (111 lb 1.8 oz)  Body mass index is 17.93 kg/m².      Intake/Output Summary (Last 24 hours) at 12/31/2022 0735  Last data filed at 12/31/2022 0702  Gross per 24 hour   Intake 4484.56 ml   Output 1990 ml   Net 2494.56 ml       Physical Exam  Constitutional:       Interventions: He is sedated, intubated and restrained.      Comments: Unkempt, appears older than stated age    HENT:      Head: Normocephalic and atraumatic.      Mouth/Throat:      Mouth: Mucous membranes are moist.      Pharynx: Oropharynx is clear.   Eyes:      Conjunctiva/sclera: Conjunctivae normal.      Pupils: Pupils are equal, round, and reactive to light.      Comments: Pupils 2+ and reactive    Cardiovascular:      Rate and Rhythm: Normal rate and regular rhythm.      Pulses: Normal pulses.   Pulmonary:      Effort: He is intubated.      Comments: Synchronous with vent. Mechanical breath sounds but clear throughout   Abdominal:      General: Bowel sounds are normal. There is no distension.      Palpations: Abdomen is soft.   Musculoskeletal:         General: No deformity.      Cervical back: Normal range of motion and neck supple.      Right lower leg: No edema.      Left lower leg: No edema.   Skin:     General: Skin is warm and dry.   Neurological:      Comments: Sedation in use, limiting exam- moving all extremities spontaneously, no eye opening.       Review of Systems   Unable to perform ROS: Intubated     Vents:  Vent Mode: A/C (12/31/22 0995)  Ventilator Initiated: Yes (12/30/22 7477)  Set Rate: 18 BPM (12/31/22 0524)  Vt Set: 450 mL (12/31/22  0524)  Pressure Support: 0 cmH20 (12/31/22 0524)  PEEP/CPAP: 5 cmH20 (12/31/22 0524)  Oxygen Concentration (%): 30 (12/31/22 0705)  Peak Airway Pressure: 15 cmH20 (12/31/22 0524)  Plateau Pressure: 17 cmH20 (12/31/22 0524)  Total Ve: 8.55 L/m (12/31/22 0524)  F/VT Ratio<105 (RSBI): (!) 34.78 (12/31/22 0524)    Lines/Drains/Airways       Drain  Duration                  NG/OG Tube 12/30/22 0556 Right mouth 1 day         Urethral Catheter 12/30/22 0557 16 Fr. 1 day              Airway  Duration                  Airway - Non-Surgical 12/30/22 0430 Endotracheal Tube 1 day              Peripheral Intravenous Line  Duration                  Peripheral IV - Single Lumen 12/30/22 0559 18 G Anterior;Distal;Left Forearm 1 day         Peripheral IV - Single Lumen 12/30/22 0601 18 G Anterior;Left;Proximal Forearm 1 day         Peripheral IV - Single Lumen 12/30/22 0631 18 G Posterior;Left Hand 1 day                    Significant Labs:    CBC/Anemia Profile:  Recent Labs   Lab 12/29/22 2302 12/30/22  1251 12/31/22  0540   WBC 18.38* 14.81* 12.02   HGB 13.9* 12.4* 10.8*   HCT 42.0 39.1* 33.8*    259 248   MCV 88 92 90   RDW 12.7 13.2 13.4        Chemistries:  Recent Labs   Lab 12/29/22 2302 12/30/22  1251 12/30/22  2021 12/31/22  0540    142 142 140   K 4.1 4.3 4.4 3.6    113* 111* 112*   CO2 18* 17* 20* 18*   BUN 28* 18 15 12   CREATININE 0.9 0.7 0.8 0.7   CALCIUM 10.0 9.0 9.3 8.7   ALBUMIN 4.0 3.2*  --   --    PROT 8.8* 7.0  --   --    BILITOT 0.4 0.6  --   --    ALKPHOS 90 76  --   --    ALT 8* 16  --   --    AST 22 65*  --   --    MG  --  2.0  --   --        All pertinent labs within the past 24 hours have been reviewed.    Significant Imaging:  I have reviewed all pertinent imaging results/findings within the past 24 hours.

## 2022-12-31 NOTE — PROGRESS NOTES
[facility] - Intensive Care ([facility])  Critical Care Medicine  Progress Note    Patient Name: Lucas Bernal II  MRN: [id number]  Admission Date: 12/29/2022  Hospital Length of Stay: 1 days  Code Status: Full Code  Attending Provider: Victoriano Tejada MD  Primary Care Provider: Primary Doctor No   Principal Problem: Acute psychosis    Subjective:     HPI:  52 year old male with known h/o heroin use and daily ETOH along with every day tobacco smoker    Presents to ED via EMS called when found altered saying that he could not breathe and febrile (102) by son; last seen normal in morning  Arrived combative, screaming, cursing  Attempted sedation with ativan total 14 mg; benadryl; haldol; zyprexa; and precedex. Ultimately required intubation for sedation    Labs revealed leukocytosis 18k with negative UA, CXR, and CT head  Lactate 3.6; CPK trending up 351>1931; CO2 18  UDS positive benzo (given prior to screen) along with THC and amphetamine    Critical care team contacted for admission for ongoing management and mechanical vent support  Seen and examined in ED       Hospital/ICU Course:  12/31- ongoing agitation overnight despite significant sedation- not redirectable or following commands and fentanyl was increased; CPK down trending, renal function normal with good urine output, no fevers         Objective:     Vital Signs (Most Recent):  Temp: 99 °F (37.2 °C) (12/31/22 0701)  Pulse: 74 (12/31/22 0701)  Resp: 18 (12/31/22 0701)  BP: 103/61 (12/31/22 0701)  SpO2: 97 % (12/31/22 0701) Vital Signs (24h Range):  Temp:  [97.2 °F (36.2 °C)-99 °F (37.2 °C)] 99 °F (37.2 °C)  Pulse:  [] 74  Resp:  [0-36] 18  SpO2:  [93 %-100 %] 97 %  BP: ()/(55-98) 103/61     Weight: 50.4 kg (111 lb 1.8 oz)  Body mass index is 17.93 kg/m².      Intake/Output Summary (Last 24 hours) at 12/31/2022 0735  Last data filed at 12/31/2022 0702  Gross per 24 hour   Intake 4484.56 ml   Output 1990 ml   Net 2494.56 ml       Physical  Exam  Constitutional:       Interventions: He is sedated, intubated and restrained.      Comments: Unkempt, appears older than stated age    HENT:      Head: Normocephalic and atraumatic.      Mouth/Throat:      Mouth: Mucous membranes are moist.      Pharynx: Oropharynx is clear.   Eyes:      Conjunctiva/sclera: Conjunctivae normal.      Pupils: Pupils are equal, round, and reactive to light.      Comments: Pupils 2+ and reactive    Cardiovascular:      Rate and Rhythm: Normal rate and regular rhythm.      Pulses: Normal pulses.   Pulmonary:      Effort: He is intubated.      Comments: Synchronous with vent. Mechanical breath sounds but clear throughout   Abdominal:      General: Bowel sounds are normal. There is no distension.      Palpations: Abdomen is soft.   Musculoskeletal:         General: No deformity.      Cervical back: Normal range of motion and neck supple.      Right lower leg: No edema.      Left lower leg: No edema.   Skin:     General: Skin is warm and dry.   Neurological:      Comments: Sedation in use, limiting exam- moving all extremities spontaneously, no eye opening.       Review of Systems   Unable to perform ROS: Intubated     Vents:  Vent Mode: A/C (12/31/22 0524)  Ventilator Initiated: Yes (12/30/22 0436)  Set Rate: 18 BPM (12/31/22 0524)  Vt Set: 450 mL (12/31/22 0524)  Pressure Support: 0 cmH20 (12/31/22 0524)  PEEP/CPAP: 5 cmH20 (12/31/22 0524)  Oxygen Concentration (%): 30 (12/31/22 0705)  Peak Airway Pressure: 15 cmH20 (12/31/22 0524)  Plateau Pressure: 17 cmH20 (12/31/22 0524)  Total Ve: 8.55 L/m (12/31/22 0524)  F/VT Ratio<105 (RSBI): (!) 34.78 (12/31/22 0524)    Lines/Drains/Airways       Drain  Duration                  NG/OG Tube 12/30/22 0556 Right mouth 1 day         Urethral Catheter 12/30/22 0557 16 Fr. 1 day              Airway  Duration                  Airway - Non-Surgical 12/30/22 0430 Endotracheal Tube 1 day              Peripheral Intravenous Line  Duration               "    Peripheral IV - Single Lumen 12/30/22 0559 18 G Anterior;Distal;Left Forearm 1 day         Peripheral IV - Single Lumen 12/30/22 0601 18 G Anterior;Left;Proximal Forearm 1 day         Peripheral IV - Single Lumen 12/30/22 0631 18 G Posterior;Left Hand 1 day                    Significant Labs:    CBC/Anemia Profile:  Recent Labs   Lab 12/29/22  2302 12/30/22  1251 12/31/22  0540   WBC 18.38* 14.81* 12.02   HGB 13.9* 12.4* 10.8*   HCT 42.0 39.1* 33.8*    259 248   MCV 88 92 90   RDW 12.7 13.2 13.4        Chemistries:  Recent Labs   Lab 12/29/22 2302 12/30/22  1251 12/30/22 2021 12/31/22  0540    142 142 140   K 4.1 4.3 4.4 3.6    113* 111* 112*   CO2 18* 17* 20* 18*   BUN 28* 18 15 12   CREATININE 0.9 0.7 0.8 0.7   CALCIUM 10.0 9.0 9.3 8.7   ALBUMIN 4.0 3.2*  --   --    PROT 8.8* 7.0  --   --    BILITOT 0.4 0.6  --   --    ALKPHOS 90 76  --   --    ALT 8* 16  --   --    AST 22 65*  --   --    MG  --  2.0  --   --        All pertinent labs within the past 24 hours have been reviewed.    Significant Imaging:  I have reviewed all pertinent imaging results/findings within the past 24 hours.      ABG  Recent Labs   Lab 12/31/22  0428   PH 7.407   PO2 83   PCO2 34.6*   HCO3 21.8*   BE -3     Assessment/Plan:     Psychiatric  * Acute psychosis  Suspect polysubstance use - daily ETOH + amphetamine use  CT head without acute findings  Sedation for control of hyperactive psychosis and prevention of exacerbation of rhabdo  Adding a dose of PO valium - may schedule benzo   Daily and prn SAT for evaluation of extubation readiness and neuro exam    Polysubstance abuse  Family reports tobacco, ETOH, heroin use; UDS positive THC and amphetamine  Thiamine and folic acid   Report was "detoxing from heroin"; if desires cessation post extubation and amenable - inpt psych/drug detox program would be appropriate once medically stable      Pulmonary  On mechanically assisted ventilation  Intubated for airway " protection to allow adequate sedation  Vent settings reviewed and adjusted to optimize gas exchange  Daily and prn SAT with SBT for extubation when tolerates sedation vacation  VAP prophylaxis    Oncology  Leukocytosis  Suspect reactive   No further fevers, WBC normalized   BC negative so far  DC vancomycin     Orthopedic  Non-traumatic rhabdomyolysis  S/t hyperactive psychosis; suspect amphetamine related  Sedation   CPK down trending, renal function preserved with good urine output  Decreased IVFs   Follow labs      Critical Care Daily Checklist:    A: Awake: RASS Goal/Actual Goal: RASS Goal: -2-->light sedation  Actual: Patricio Agitation Sedation Scale (RASS): Drowsy   B: Spontaneous Breathing Trial Performed?   to be performed    C: SAT & SBT Coordinated?  yes                    D: Delirium: CAM-ICU Overall CAM-ICU: Positive   E: Early Mobility Performed? Yes   F: Feeding Goal:    Status:     Current Diet Order   Procedures    Diet NPO      AS: Analgesia/Sedation Propofol/fentanyl infusions   T: Thromboembolic Prophylaxis lovenox   H: HOB > 300 Yes   U: Stress Ulcer Prophylaxis (if needed) pepcid   G: Glucose Control NA   B: Bowel Function     I: Indwelling Catheter (Lines & Torres) Necessity Hemodynamic monitoring    D: De-escalation of Antimicrobials/Pharmacotherapies DC Vancomycin     Plan for the day/ETD SAT to evaluate for improved mentation; SBT if appropriate     Code Status:  Family/Goals of Care: Full Code       Critical Care Time: 31 minutes  Critical secondary to hyperactive psychosis 2/2 polysubstance use requiring heavy sedation and management of mechanical ventilation      Critical care was time spent personally by me on the following activities: development of treatment plan with patient or surrogate and bedside caregivers, discussions with consultants, evaluation of patient's response to treatment, examination of patient, ordering and performing treatments and interventions, ordering and review  of laboratory studies, ordering and review of radiographic studies, pulse oximetry, re-evaluation of patient's condition. This critical care time did not overlap with that of any other provider or involve time for any procedures.     Jj Tony NP  Critical Care Medicine  [facility] - Intensive Care ([facility])

## 2022-12-31 NOTE — PLAN OF CARE
Patient remains intubated and sedated on fentanyl and propofol, RASS -2. Patient follows commands. Continues to try to pull at tube. Restrains in place. Torres with adequate output. Safety measures in place. Will continue to monitor.

## 2022-12-31 NOTE — PLAN OF CARE
Patient very agitated, continuously trying to pull ETT and climb out the bed. Unable to redirect to calm down, fentanyl increased to 250 per NP, propofol at 50

## 2023-01-01 LAB
ALBUMIN SERPL BCP-MCNC: 2.3 G/DL (ref 3.5–5.2)
ALLENS TEST: ABNORMAL
ALP SERPL-CCNC: 64 U/L (ref 55–135)
ALT SERPL W/O P-5'-P-CCNC: 14 U/L (ref 10–44)
ANION GAP SERPL CALC-SCNC: 9 MMOL/L (ref 8–16)
AST SERPL-CCNC: 30 U/L (ref 10–40)
BASOPHILS # BLD AUTO: 0.04 K/UL (ref 0–0.2)
BASOPHILS NFR BLD: 0.5 % (ref 0–1.9)
BILIRUB SERPL-MCNC: 0.2 MG/DL (ref 0.1–1)
BUN SERPL-MCNC: 9 MG/DL (ref 6–20)
CALCIUM SERPL-MCNC: 8.2 MG/DL (ref 8.7–10.5)
CHLORIDE SERPL-SCNC: 113 MMOL/L (ref 95–110)
CO2 SERPL-SCNC: 20 MMOL/L (ref 23–29)
CREAT SERPL-MCNC: 0.7 MG/DL (ref 0.5–1.4)
DELSYS: ABNORMAL
DIFFERENTIAL METHOD: ABNORMAL
EOSINOPHIL # BLD AUTO: 0.3 K/UL (ref 0–0.5)
EOSINOPHIL NFR BLD: 3.9 % (ref 0–8)
ERYTHROCYTE [DISTWIDTH] IN BLOOD BY AUTOMATED COUNT: 13.4 % (ref 11.5–14.5)
ERYTHROCYTE [SEDIMENTATION RATE] IN BLOOD BY WESTERGREN METHOD: 18 MM/H
EST. GFR  (NO RACE VARIABLE): >60 ML/MIN/1.73 M^2
FIO2: 30
GLUCOSE SERPL-MCNC: 57 MG/DL (ref 70–110)
HCO3 UR-SCNC: 20.6 MMOL/L (ref 24–28)
HCT VFR BLD AUTO: 32.2 % (ref 40–54)
HGB BLD-MCNC: 10.6 G/DL (ref 14–18)
IMM GRANULOCYTES # BLD AUTO: 0.02 K/UL (ref 0–0.04)
IMM GRANULOCYTES NFR BLD AUTO: 0.3 % (ref 0–0.5)
LYMPHOCYTES # BLD AUTO: 2.2 K/UL (ref 1–4.8)
LYMPHOCYTES NFR BLD: 28.5 % (ref 18–48)
MCH RBC QN AUTO: 29.1 PG (ref 27–31)
MCHC RBC AUTO-ENTMCNC: 32.9 G/DL (ref 32–36)
MCV RBC AUTO: 89 FL (ref 82–98)
MODE: ABNORMAL
MONOCYTES # BLD AUTO: 0.6 K/UL (ref 0.3–1)
MONOCYTES NFR BLD: 7.9 % (ref 4–15)
NEUTROPHILS # BLD AUTO: 4.6 K/UL (ref 1.8–7.7)
NEUTROPHILS NFR BLD: 58.9 % (ref 38–73)
NRBC BLD-RTO: 0 /100 WBC
PCO2 BLDA: 31.4 MMHG (ref 35–45)
PEEP: 5
PH SMN: 7.42 [PH] (ref 7.35–7.45)
PLATELET # BLD AUTO: 206 K/UL (ref 150–450)
PMV BLD AUTO: 10.4 FL (ref 9.2–12.9)
PO2 BLDA: 127 MMHG (ref 80–100)
POC BE: -4 MMOL/L
POC SATURATED O2: 99 % (ref 95–100)
POCT GLUCOSE: 52 MG/DL (ref 70–110)
POCT GLUCOSE: 67 MG/DL (ref 70–110)
POCT GLUCOSE: 74 MG/DL (ref 70–110)
POTASSIUM SERPL-SCNC: 3.1 MMOL/L (ref 3.5–5.1)
PROT SERPL-MCNC: 5.2 G/DL (ref 6–8.4)
RBC # BLD AUTO: 3.64 M/UL (ref 4.6–6.2)
SAMPLE: ABNORMAL
SITE: ABNORMAL
SODIUM SERPL-SCNC: 142 MMOL/L (ref 136–145)
VT: 450
WBC # BLD AUTO: 7.73 K/UL (ref 3.9–12.7)

## 2023-01-01 PROCEDURE — 82803 BLOOD GASES ANY COMBINATION: CPT

## 2023-01-01 PROCEDURE — 80053 COMPREHEN METABOLIC PANEL: CPT | Performed by: NURSE PRACTITIONER

## 2023-01-01 PROCEDURE — 94761 N-INVAS EAR/PLS OXIMETRY MLT: CPT

## 2023-01-01 PROCEDURE — 25000003 PHARM REV CODE 250: Performed by: NURSE PRACTITIONER

## 2023-01-01 PROCEDURE — 63600175 PHARM REV CODE 636 W HCPCS: Performed by: NURSE PRACTITIONER

## 2023-01-01 PROCEDURE — 36415 COLL VENOUS BLD VENIPUNCTURE: CPT | Performed by: NURSE PRACTITIONER

## 2023-01-01 PROCEDURE — 94003 VENT MGMT INPAT SUBQ DAY: CPT

## 2023-01-01 PROCEDURE — 20000000 HC ICU ROOM

## 2023-01-01 PROCEDURE — 85025 COMPLETE CBC W/AUTO DIFF WBC: CPT | Performed by: NURSE PRACTITIONER

## 2023-01-01 PROCEDURE — C9399 UNCLASSIFIED DRUGS OR BIOLOG: HCPCS | Performed by: NURSE PRACTITIONER

## 2023-01-01 PROCEDURE — 27000221 HC OXYGEN, UP TO 24 HOURS

## 2023-01-01 PROCEDURE — 27200966 HC CLOSED SUCTION SYSTEM

## 2023-01-01 PROCEDURE — 99900026 HC AIRWAY MAINTENANCE (STAT)

## 2023-01-01 PROCEDURE — 99900035 HC TECH TIME PER 15 MIN (STAT)

## 2023-01-01 PROCEDURE — 36600 WITHDRAWAL OF ARTERIAL BLOOD: CPT

## 2023-01-01 RX ORDER — DEXMEDETOMIDINE HYDROCHLORIDE 4 UG/ML
0-1.4 INJECTION INTRAVENOUS CONTINUOUS
Status: DISCONTINUED | OUTPATIENT
Start: 2023-01-01 | End: 2023-01-02

## 2023-01-01 RX ORDER — POTASSIUM CHLORIDE 20 MEQ/1
40 TABLET, EXTENDED RELEASE ORAL ONCE
Status: DISCONTINUED | OUTPATIENT
Start: 2023-01-01 | End: 2023-01-01

## 2023-01-01 RX ORDER — IBUPROFEN 200 MG
24 TABLET ORAL
Status: DISCONTINUED | OUTPATIENT
Start: 2023-01-01 | End: 2023-01-02 | Stop reason: HOSPADM

## 2023-01-01 RX ORDER — IBUPROFEN 200 MG
16 TABLET ORAL
Status: DISCONTINUED | OUTPATIENT
Start: 2023-01-01 | End: 2023-01-02 | Stop reason: HOSPADM

## 2023-01-01 RX ORDER — GLUCAGON 1 MG
1 KIT INJECTION
Status: DISCONTINUED | OUTPATIENT
Start: 2023-01-01 | End: 2023-01-02 | Stop reason: HOSPADM

## 2023-01-01 RX ORDER — DIAZEPAM 10 MG/2ML
5 INJECTION INTRAMUSCULAR EVERY 4 HOURS PRN
Status: DISCONTINUED | OUTPATIENT
Start: 2023-01-01 | End: 2023-01-02 | Stop reason: HOSPADM

## 2023-01-01 RX ADMIN — CHLORHEXIDINE GLUCONATE 0.12% ORAL RINSE 15 ML: 1.2 LIQUID ORAL at 09:01

## 2023-01-01 RX ADMIN — DEXMEDETOMIDINE HYDROCHLORIDE 0.2 MCG/KG/HR: 4 INJECTION INTRAVENOUS at 08:01

## 2023-01-01 RX ADMIN — DEXMEDETOMIDINE HYDROCHLORIDE 0.6 MCG/KG/HR: 4 INJECTION INTRAVENOUS at 06:01

## 2023-01-01 RX ADMIN — DIAZEPAM 5 MG: 5 TABLET ORAL at 02:01

## 2023-01-01 RX ADMIN — FOLIC ACID 1 MG: 1 TABLET ORAL at 09:01

## 2023-01-01 RX ADMIN — DEXTROSE 125 ML: 10 SOLUTION INTRAVENOUS at 07:01

## 2023-01-01 RX ADMIN — FAMOTIDINE 20 MG: 10 INJECTION, SOLUTION INTRAVENOUS at 09:01

## 2023-01-01 RX ADMIN — DIAZEPAM 5 MG: 5 TABLET ORAL at 07:01

## 2023-01-01 RX ADMIN — POTASSIUM BICARBONATE 40 MEQ: 391 TABLET, EFFERVESCENT ORAL at 09:01

## 2023-01-01 RX ADMIN — MUPIROCIN: 20 OINTMENT TOPICAL at 09:01

## 2023-01-01 RX ADMIN — DIAZEPAM 5 MG: 5 TABLET ORAL at 08:01

## 2023-01-01 RX ADMIN — Medication 100 MG: at 09:01

## 2023-01-01 RX ADMIN — FENTANYL CITRATE 50 MCG: 50 INJECTION INTRAMUSCULAR; INTRAVENOUS at 10:01

## 2023-01-01 RX ADMIN — DEXTROSE 125 ML: 10 SOLUTION INTRAVENOUS at 01:01

## 2023-01-01 RX ADMIN — PROPOFOL 45 MCG/KG/MIN: 10 INJECTION, EMULSION INTRAVENOUS at 04:01

## 2023-01-01 RX ADMIN — DIAZEPAM 5 MG: 5 INJECTION INTRAMUSCULAR; INTRAVENOUS at 11:01

## 2023-01-01 RX ADMIN — FAMOTIDINE 20 MG: 10 INJECTION, SOLUTION INTRAVENOUS at 08:01

## 2023-01-01 RX ADMIN — CHLORHEXIDINE GLUCONATE 0.12% ORAL RINSE 15 ML: 1.2 LIQUID ORAL at 08:01

## 2023-01-01 RX ADMIN — MUPIROCIN: 20 OINTMENT TOPICAL at 08:01

## 2023-01-01 NOTE — PROGRESS NOTES
[facility] - Intensive Care ([facility])  Critical Care Medicine  Progress Note    Patient Name: Lucas Bernal II  MRN: [id number]  Admission Date: 12/29/2022  Hospital Length of Stay: 2 days  Code Status: Full Code  Attending Provider: Victoriano Tejada MD  Primary Care Provider: Primary Doctor No   Principal Problem: Acute psychosis    Subjective:     HPI:  52 year old male with known h/o heroin use and daily ETOH along with every day tobacco smoker    Presents to ED via EMS called when found altered saying that he could not breathe and febrile (102) by son; last seen normal in morning  Arrived combative, screaming, cursing  Attempted sedation with ativan total 14 mg; benadryl; haldol; zyprexa; and precedex. Ultimately required intubation for sedation    Labs revealed leukocytosis 18k with negative UA, CXR, and CT head  Lactate 3.6; CPK trending up 351>1931; CO2 18  UDS positive benzo (given prior to screen) along with THC and amphetamine    Critical care team contacted for admission for ongoing management and mechanical vent support  Seen and examined in ED       Hospital/ICU Course:  12/31- ongoing agitation overnight despite significant sedation- not redirectable or following commands and fentanyl was increased; CPK down trending, renal function normal with good urine output, no fevers     1/1- ongoing agitation yesterday when sedation lowered; valium added. SAT/SBT today          Objective:     Vital Signs (Most Recent):  Temp: 97.9 °F (36.6 °C) (01/01/23 0300)  Pulse: 60 (01/01/23 0600)  Resp: 18 (01/01/23 0600)  BP: 107/73 (01/01/23 0600)  SpO2: 100 % (01/01/23 0600) Vital Signs (24h Range):  Temp:  [97.9 °F (36.6 °C)-98.2 °F (36.8 °C)] 97.9 °F (36.6 °C)  Pulse:  [54-96] 60  Resp:  [17-30] 18  SpO2:  [98 %-100 %] 100 %  BP: ()/() 107/73     Weight: 50.4 kg (111 lb 1.8 oz)  Body mass index is 17.93 kg/m².      Intake/Output Summary (Last 24 hours) at 1/1/2023 8460  Last data filed at 1/1/2023  0600  Gross per 24 hour   Intake 3086.52 ml   Output 1920 ml   Net 1166.52 ml       Physical Exam  Vitals reviewed.   Constitutional:       Interventions: He is sedated, intubated and restrained.   HENT:      Head: Normocephalic and atraumatic.      Mouth/Throat:      Mouth: Mucous membranes are moist.      Pharynx: Oropharynx is clear.   Eyes:      Conjunctiva/sclera: Conjunctivae normal.      Pupils: Pupils are equal, round, and reactive to light.   Cardiovascular:      Rate and Rhythm: Normal rate and regular rhythm.      Pulses: Normal pulses.   Pulmonary:      Effort: He is intubated.      Comments: Synchronous with vent. Mechanical breath sounds but clear overall.   Abdominal:      General: Bowel sounds are normal.      Palpations: Abdomen is soft.   Genitourinary:     Comments: Green tinged, clear urine  Musculoskeletal:         General: No deformity.      Cervical back: Normal range of motion and neck supple.      Right lower leg: No edema.      Left lower leg: No edema.   Skin:     General: Skin is warm and dry.   Neurological:      Mental Status: He is easily aroused.      Comments: Sedated which limits exam.opens eyes and tracking some, attempts to sit up and moving all extremities. Not following commands     Review of Systems    Vents:  Vent Mode: A/C (01/01/23 0548)  Ventilator Initiated: Yes (12/30/22 0436)  Set Rate: 18 BPM (01/01/23 0548)  Vt Set: 450 mL (01/01/23 0548)  Pressure Support: 0 cmH20 (01/01/23 0548)  PEEP/CPAP: 5 cmH20 (01/01/23 0548)  Oxygen Concentration (%): 30 (01/01/23 0600)  Peak Airway Pressure: 14 cmH20 (01/01/23 0548)  Plateau Pressure: 14 cmH20 (01/01/23 0548)  Total Ve: 8.14 L/m (01/01/23 0548)  F/VT Ratio<105 (RSBI): (!) 39.82 (01/01/23 0548)    Lines/Drains/Airways       Drain  Duration                  NG/OG Tube 12/30/22 0556 Right mouth 2 days         Urethral Catheter 12/30/22 0557 16 Fr. 2 days              Airway  Duration                  Airway - Non-Surgical  "12/30/22 0430 Endotracheal Tube 2 days              Peripheral Intravenous Line  Duration                  Peripheral IV - Single Lumen 12/30/22 0559 18 G Anterior;Distal;Left Forearm 2 days         Peripheral IV - Single Lumen 12/30/22 0601 18 G Anterior;Left;Proximal Forearm 2 days         Peripheral IV - Single Lumen 12/30/22 0631 18 G Posterior;Left Hand 2 days                    Significant Labs:    CBC/Anemia Profile:  Recent Labs   Lab 12/30/22  1251 12/31/22  0540 01/01/23  0438   WBC 14.81* 12.02 7.73   HGB 12.4* 10.8* 10.6*   HCT 39.1* 33.8* 32.2*    248 206   MCV 92 90 89   RDW 13.2 13.4 13.4        Chemistries:  Recent Labs   Lab 12/30/22  1251 12/30/22 2021 12/31/22  0540 01/01/23  0438    142 140 142   K 4.3 4.4 3.6 3.1*   * 111* 112* 113*   CO2 17* 20* 18* 20*   BUN 18 15 12 9   CREATININE 0.7 0.8 0.7 0.7   CALCIUM 9.0 9.3 8.7 8.2*   ALBUMIN 3.2*  --   --  2.3*   PROT 7.0  --   --  5.2*   BILITOT 0.6  --   --  0.2   ALKPHOS 76  --   --  64   ALT 16  --   --  14   AST 65*  --   --  30   MG 2.0  --   --   --        All pertinent labs within the past 24 hours have been reviewed.    Significant Imaging:  I have reviewed all pertinent imaging results/findings within the past 24 hours.      ABG  Recent Labs   Lab 01/01/23  0314   PH 7.425   PO2 127*   PCO2 31.4*   HCO3 20.6*   BE -4     Assessment/Plan:     Psychiatric  * Acute psychosis  Suspect polysubstance use - daily ETOH + amphetamine use  CT head without acute findings  Cont scheduled valium   Daily and prn SAT for evaluation of extubation readiness and neuro exam  Will transition just to precedex in attempts to extubate   If unable to extubate, will start tube feeds    Polysubstance abuse  Family reports tobacco, ETOH, heroin use; UDS positive THC and amphetamine  Thiamine and folic acid   Report was "detoxing from heroin"; if desires cessation post extubation and amenable - inpt psych/drug detox program would be appropriate once " medically stable      Pulmonary  On mechanically assisted ventilation  Intubated for airway protection to allow adequate sedation  Vent settings reviewed and adjusted to optimize gas exchange  VAP prophylaxis  Changing to precedex, stopping propofol and fentanyl- will assess mental status and agitation after transition and assess for ability to extubate   SBT this am with some periods of apnea- though suspect sedation related, will assess again when propofol and fentanyl have been off for a few hours    Oncology  Leukocytosis  Suspect reactive   No further fevers, WBC normalized   BC negative so far  DC vancomycin     Orthopedic  Non-traumatic rhabdomyolysis  S/t hyperactive psychosis; suspect amphetamine related  Sedation   CPK down trended, renal function preserved with good urine output  Stop IVFs   Follow labs         Jj Tony NP  Critical Care Medicine  [facility] - Intensive Care ([facility])

## 2023-01-01 NOTE — ASSESSMENT & PLAN NOTE
Suspect polysubstance use - daily ETOH + amphetamine use  CT head without acute findings  Cont scheduled valium   Daily and prn SAT for evaluation of extubation readiness and neuro exam  Will transition just to precedex in attempts to extubate   If unable to extubate, will start tube feeds

## 2023-01-01 NOTE — PLAN OF CARE
Pt remains intubated/sedated/restrained. Arouses to voice but becomes agitated. No command following. Moves all extremities. Vital signs stable. Education and safety reviewed

## 2023-01-01 NOTE — SUBJECTIVE & OBJECTIVE
Objective:     Vital Signs (Most Recent):  Temp: 97.9 °F (36.6 °C) (01/01/23 0300)  Pulse: 60 (01/01/23 0600)  Resp: 18 (01/01/23 0600)  BP: 107/73 (01/01/23 0600)  SpO2: 100 % (01/01/23 0600) Vital Signs (24h Range):  Temp:  [97.9 °F (36.6 °C)-98.2 °F (36.8 °C)] 97.9 °F (36.6 °C)  Pulse:  [54-96] 60  Resp:  [17-30] 18  SpO2:  [98 %-100 %] 100 %  BP: ()/() 107/73     Weight: 50.4 kg (111 lb 1.8 oz)  Body mass index is 17.93 kg/m².      Intake/Output Summary (Last 24 hours) at 1/1/2023 0750  Last data filed at 1/1/2023 0600  Gross per 24 hour   Intake 3086.52 ml   Output 1920 ml   Net 1166.52 ml       Physical Exam  Vitals reviewed.   Constitutional:       Interventions: He is sedated, intubated and restrained.   HENT:      Head: Normocephalic and atraumatic.      Mouth/Throat:      Mouth: Mucous membranes are moist.      Pharynx: Oropharynx is clear.   Eyes:      Conjunctiva/sclera: Conjunctivae normal.      Pupils: Pupils are equal, round, and reactive to light.   Cardiovascular:      Rate and Rhythm: Normal rate and regular rhythm.      Pulses: Normal pulses.   Pulmonary:      Effort: He is intubated.      Comments: Synchronous with vent. Mechanical breath sounds but clear overall.   Abdominal:      General: Bowel sounds are normal.      Palpations: Abdomen is soft.   Genitourinary:     Comments: Green tinged, clear urine  Musculoskeletal:         General: No deformity.      Cervical back: Normal range of motion and neck supple.      Right lower leg: No edema.      Left lower leg: No edema.   Skin:     General: Skin is warm and dry.   Neurological:      Mental Status: He is easily aroused.      Comments: Sedated which limits exam.opens eyes and tracking some, attempts to sit up and moving all extremities. Not following commands     Review of Systems    Vents:  Vent Mode: A/C (01/01/23 0548)  Ventilator Initiated: Yes (12/30/22 0436)  Set Rate: 18 BPM (01/01/23 0548)  Vt Set: 450 mL (01/01/23  0548)  Pressure Support: 0 cmH20 (01/01/23 0548)  PEEP/CPAP: 5 cmH20 (01/01/23 0548)  Oxygen Concentration (%): 30 (01/01/23 0600)  Peak Airway Pressure: 14 cmH20 (01/01/23 0548)  Plateau Pressure: 14 cmH20 (01/01/23 0548)  Total Ve: 8.14 L/m (01/01/23 0548)  F/VT Ratio<105 (RSBI): (!) 39.82 (01/01/23 0548)    Lines/Drains/Airways       Drain  Duration                  NG/OG Tube 12/30/22 0556 Right mouth 2 days         Urethral Catheter 12/30/22 0557 16 Fr. 2 days              Airway  Duration                  Airway - Non-Surgical 12/30/22 0430 Endotracheal Tube 2 days              Peripheral Intravenous Line  Duration                  Peripheral IV - Single Lumen 12/30/22 0559 18 G Anterior;Distal;Left Forearm 2 days         Peripheral IV - Single Lumen 12/30/22 0601 18 G Anterior;Left;Proximal Forearm 2 days         Peripheral IV - Single Lumen 12/30/22 0631 18 G Posterior;Left Hand 2 days                    Significant Labs:    CBC/Anemia Profile:  Recent Labs   Lab 12/30/22  1251 12/31/22  0540 01/01/23  0438   WBC 14.81* 12.02 7.73   HGB 12.4* 10.8* 10.6*   HCT 39.1* 33.8* 32.2*    248 206   MCV 92 90 89   RDW 13.2 13.4 13.4        Chemistries:  Recent Labs   Lab 12/30/22  1251 12/30/22 2021 12/31/22  0540 01/01/23  0438    142 140 142   K 4.3 4.4 3.6 3.1*   * 111* 112* 113*   CO2 17* 20* 18* 20*   BUN 18 15 12 9   CREATININE 0.7 0.8 0.7 0.7   CALCIUM 9.0 9.3 8.7 8.2*   ALBUMIN 3.2*  --   --  2.3*   PROT 7.0  --   --  5.2*   BILITOT 0.6  --   --  0.2   ALKPHOS 76  --   --  64   ALT 16  --   --  14   AST 65*  --   --  30   MG 2.0  --   --   --        All pertinent labs within the past 24 hours have been reviewed.    Significant Imaging:  I have reviewed all pertinent imaging results/findings within the past 24 hours.

## 2023-01-01 NOTE — PLAN OF CARE
[facility] - Intensive Care (Hospital)  Initial Discharge Assessment     Anticipated DC dispo: Home; home with family  Prior Level of Function: Lives at home with two sons, independent with ADLs  PCP: None; needs to get established if agreeable      Comments: SW met with patient at the bedside to complete assessment. Pt's RN present for conversation. Pt appeared to be tired and spoke softly but answered SW questions appropriately. Pt states that he lives at home with his sons, Rob and Artis. Pt states he's able to care for himself independently and son's will assist with discharge transportation.     Of note, patient UDS positive for several substances; pt may benefit from substance use resources, if agreeable.     SW to remain available to assist with CM needs for discharge.       Primary Care Provider: Primary Doctor No    Admission Diagnosis: Methamphetamine use [F15.10]  Acute psychosis [F23]  Non-traumatic rhabdomyolysis [M62.82]    Admission Date: 12/29/2022  Expected Discharge Date:     Discharge Barriers Identified: Mental illness, Substance Abuse    Payor: MEDICAID / Plan: [place] [place] [place] / Product Type: Managed Medicaid /     Extended Emergency Contact Information  Primary Emergency Contact: Rob Kaur   [facility]  Home Phone: [phone]  Relation: Son    Discharge Plan A: Home  Discharge Plan B: Home      [facility] [address] [place] [address]  Phone: [phone] Fax: [phone]    [id number] - [place], [place] - [address] AT [facility] [address]  [address]  [facility] [address]  Phone: [phone] Fax: [phone]      Initial Assessment (most recent)       Adult Discharge Assessment - 01/01/23 1154          Discharge Assessment    Assessment Type Discharge Planning Assessment     Confirmed/corrected address, phone number and insurance Yes     Confirmed Demographics Correct on  Facesheet     Source of Information patient     Communicated KEYSHAWN with patient/caregiver Date not available/Unable to determine     Reason For Admission Acute psychosis     People in Home child(effie), adult     Facility Arrived From: Home     Do you expect to return to your current living situation? Yes     Do you have help at home or someone to help you manage your care at home? Yes     Who are your caregiver(s) and their phone number(s)? Pt's sons Rob and Vincent     Prior to hospitilization cognitive status: Alert/Oriented     Current cognitive status: Alert/Oriented     Equipment Currently Used at Home none     Readmission within 30 days? No     Patient currently being followed by outpatient case management? No     Do you currently have service(s) that help you manage your care at home? No     Do you take prescription medications? Yes     Do you have prescription coverage? Yes     Do you have any problems affording any of your prescribed medications? No     Is the patient taking medications as prescribed? yes     Who is going to help you get home at discharge? Pt's sons can assist with transport     How do you get to doctors appointments? family or friend will provide     Are you on dialysis? No     Do you take coumadin? No     Discharge Plan A Home     Discharge Plan B Home     DME Needed Upon Discharge  none     Discharge Plan discussed with: Patient     Discharge Barriers Identified Mental illness;Substance Abuse

## 2023-01-01 NOTE — ASSESSMENT & PLAN NOTE
Intubated for airway protection to allow adequate sedation  Vent settings reviewed and adjusted to optimize gas exchange  VAP prophylaxis  Changing to precedex, stopping propofol and fentanyl- will assess mental status and agitation after transition and assess for ability to extubate   SBT this am with some periods of apnea- though suspect sedation related, will assess again when propofol and fentanyl have been off for a few hours

## 2023-01-01 NOTE — ASSESSMENT & PLAN NOTE
S/t hyperactive psychosis; suspect amphetamine related  Sedation   CPK down trended, renal function preserved with good urine output  Stop IVFs   Follow labs

## 2023-01-02 VITALS
TEMPERATURE: 99 F | HEIGHT: 66 IN | WEIGHT: 112.88 LBS | OXYGEN SATURATION: 97 % | DIASTOLIC BLOOD PRESSURE: 89 MMHG | SYSTOLIC BLOOD PRESSURE: 147 MMHG | RESPIRATION RATE: 29 BRPM | HEART RATE: 78 BPM | BODY MASS INDEX: 18.14 KG/M2

## 2023-01-02 PROCEDURE — 25000003 PHARM REV CODE 250: Performed by: NURSE PRACTITIONER

## 2023-01-02 PROCEDURE — 25000003 PHARM REV CODE 250: Performed by: INTERNAL MEDICINE

## 2023-01-02 PROCEDURE — 63600175 PHARM REV CODE 636 W HCPCS: Performed by: NURSE PRACTITIONER

## 2023-01-02 PROCEDURE — 99291 PR CRITICAL CARE, E/M 30-74 MINUTES: ICD-10-PCS | Mod: ,,, | Performed by: INTERNAL MEDICINE

## 2023-01-02 PROCEDURE — 99291 CRITICAL CARE FIRST HOUR: CPT | Mod: ,,, | Performed by: INTERNAL MEDICINE

## 2023-01-02 PROCEDURE — C9399 UNCLASSIFIED DRUGS OR BIOLOG: HCPCS | Performed by: NURSE PRACTITIONER

## 2023-01-02 RX ORDER — CHLORDIAZEPOXIDE HYDROCHLORIDE 25 MG/1
25 CAPSULE, GELATIN COATED ORAL
Status: DISCONTINUED | OUTPATIENT
Start: 2023-01-02 | End: 2023-01-02 | Stop reason: HOSPADM

## 2023-01-02 RX ORDER — CHLORDIAZEPOXIDE HYDROCHLORIDE 25 MG/1
25 CAPSULE, GELATIN COATED ORAL EVERY 6 HOURS
Qty: 30 CAPSULE | Refills: 0 | Status: SHIPPED | OUTPATIENT
Start: 2023-01-02 | End: 2023-02-01

## 2023-01-02 RX ORDER — DEXMEDETOMIDINE HYDROCHLORIDE 4 UG/ML
0-1.4 INJECTION INTRAVENOUS CONTINUOUS
Status: DISCONTINUED | OUTPATIENT
Start: 2023-01-02 | End: 2023-01-02 | Stop reason: HOSPADM

## 2023-01-02 RX ORDER — FOLIC ACID 1 MG/1
1 TABLET ORAL DAILY
Qty: 30 TABLET | Refills: 2 | Status: SHIPPED | OUTPATIENT
Start: 2023-01-03 | End: 2024-01-03

## 2023-01-02 RX ORDER — LANOLIN ALCOHOL/MO/W.PET/CERES
100 CREAM (GRAM) TOPICAL DAILY
Qty: 30 TABLET | Refills: 2 | Status: SHIPPED | OUTPATIENT
Start: 2023-01-03 | End: 2023-02-02

## 2023-01-02 RX ADMIN — CHLORHEXIDINE GLUCONATE 0.12% ORAL RINSE 15 ML: 1.2 LIQUID ORAL at 08:01

## 2023-01-02 RX ADMIN — DIAZEPAM 5 MG: 5 INJECTION INTRAMUSCULAR; INTRAVENOUS at 04:01

## 2023-01-02 RX ADMIN — FOLIC ACID 1 MG: 1 TABLET ORAL at 08:01

## 2023-01-02 RX ADMIN — CHLORDIAZEPOXIDE HYDROCHLORIDE 25 MG: 25 CAPSULE ORAL at 12:01

## 2023-01-02 RX ADMIN — FENTANYL CITRATE 50 MCG: 50 INJECTION INTRAMUSCULAR; INTRAVENOUS at 12:01

## 2023-01-02 RX ADMIN — FENTANYL CITRATE 50 MCG: 50 INJECTION INTRAMUSCULAR; INTRAVENOUS at 03:01

## 2023-01-02 RX ADMIN — Medication 100 MG: at 08:01

## 2023-01-02 RX ADMIN — DEXMEDETOMIDINE HYDROCHLORIDE 1.4 MCG/KG/HR: 4 INJECTION INTRAVENOUS at 04:01

## 2023-01-02 RX ADMIN — FENTANYL CITRATE 50 MCG: 50 INJECTION INTRAMUSCULAR; INTRAVENOUS at 08:01

## 2023-01-02 RX ADMIN — FAMOTIDINE 20 MG: 10 INJECTION, SOLUTION INTRAVENOUS at 08:01

## 2023-01-02 RX ADMIN — CHLORDIAZEPOXIDE HYDROCHLORIDE 25 MG: 25 CAPSULE ORAL at 08:01

## 2023-01-02 RX ADMIN — MUPIROCIN: 20 OINTMENT TOPICAL at 08:01

## 2023-01-02 RX ADMIN — DEXMEDETOMIDINE HYDROCHLORIDE 1.4 MCG/KG/HR: 4 INJECTION INTRAVENOUS at 09:01

## 2023-01-02 RX ADMIN — CHLORDIAZEPOXIDE HYDROCHLORIDE 25 MG: 25 CAPSULE ORAL at 02:01

## 2023-01-02 NOTE — ASSESSMENT & PLAN NOTE
S/t hyperactive psychosis; suspect amphetamine related  Sedation   CPK down trended, renal function preserved with good urine output  Stop IVFs   Follow labs   1/2 not oliguric.  TANYA Torres.  Encourage p.o. intake   no

## 2023-01-02 NOTE — PLAN OF CARE
[facility] - Intensive Care (Hospital)  Discharge Final Note    Primary Care Provider: Primary Doctor No    Expected Discharge Date: 1/2/2023    Final Discharge Note (most recent)       Final Note - 01/02/23 1545          Final Note    Assessment Type Final Discharge Note     Anticipated Discharge Disposition Home or Self Care     Hospital Resources/Appts/Education Provided Appointments scheduled by Navigator/Coordinator                     Important Message from Medicare               DC Dispo: home  PCP f/u: scheduled at [facility]

## 2023-01-02 NOTE — ASSESSMENT & PLAN NOTE
Intubated for airway protection to allow adequate sedation  Vent settings reviewed and adjusted to optimize gas exchange  VAP prophylaxis  Changing to precedex, stopping propofol and fentanyl- will assess mental status and agitation after transition and assess for ability to extubate   SBT this am with some periods of apnea- though suspect sedation related, will assess again when propofol and fentanyl have been off for a few hours  1/2 extubated yesterday.  Nebs p.r.n..  Target O2 sat 92-94%

## 2023-01-02 NOTE — PROGRESS NOTES
[facility] - Intensive Care ([facility])  Critical Care Medicine  Progress Note    Patient Name: Lucas Bernal II  MRN: [id number]  Admission Date: 12/29/2022  Hospital Length of Stay: 3 days  Code Status: Full Code  Attending Provider: Carolina Ramsey MD  Primary Care Provider: Primary Doctor No   Principal Problem: Acute psychosis    Subjective:     HPI:  52 year old male with known h/o heroin use and daily ETOH along with every day tobacco smoker    Presents to ED via EMS called when found altered saying that he could not breathe and febrile (102) by son; last seen normal in morning  Arrived combative, screaming, cursing  Attempted sedation with ativan total 14 mg; benadryl; haldol; zyprexa; and precedex. Ultimately required intubation for sedation    Labs revealed leukocytosis 18k with negative UA, CXR, and CT head  Lactate 3.6; CPK trending up 351>1931; CO2 18  UDS positive benzo (given prior to screen) along with THC and amphetamine    Critical care team contacted for admission for ongoing management and mechanical vent support  Seen and examined in ED       Hospital/ICU Course:  12/31- ongoing agitation overnight despite significant sedation- not redirectable or following commands and fentanyl was increased; CPK down trending, renal function normal with good urine output, no fevers     1/1- ongoing agitation yesterday when sedation lowered; valium added. SAT/SBT today   01/02/2023 events noted.  Verbally abusive.  On Precedex drip afebrile O2 sat 100% urine output 900 mL extubated yesterday      Interval History: 01/02/2023 events noted.  Verbally abusive.  On Precedex drip afebrile O2 sat 100% urine output 900 mL extubated yesterday      Objective:     Vital Signs (Most Recent):  Temp: 98.8 °F (37.1 °C) (01/02/23 0357)  Pulse: 60 (01/02/23 0742)  Resp: (!) 21 (01/02/23 0855)  BP: (!) 162/84 (01/02/23 0600)  SpO2: 95 % (01/02/23 0742)   Vital Signs (24h Range):  Temp:  [98 °F (36.7 °C)-98.8 °F (37.1 °C)] 98.8  °F (37.1 °C)  Pulse:  [60-97] 60  Resp:  [12-29] 21  SpO2:  [92 %-100 %] 95 %  BP: (110-183)/() 162/84     Weight: 51.2 kg (112 lb 14 oz)  Body mass index is 18.22 kg/m².      Intake/Output Summary (Last 24 hours) at 1/2/2023 1123  Last data filed at 1/2/2023 0600  Gross per 24 hour   Intake 776.42 ml   Output 715 ml   Net 61.42 ml       Physical Exam  Vitals and nursing note reviewed.   Constitutional:       General: He is not in acute distress.     Appearance: He is well-developed. He is ill-appearing.   HENT:      Head: Normocephalic and atraumatic.      Nose: Nose normal.   Eyes:      Extraocular Movements: Extraocular movements intact.   Cardiovascular:      Rate and Rhythm: Normal rate and regular rhythm.   Pulmonary:      Effort: Pulmonary effort is normal. No respiratory distress.      Breath sounds: No stridor.   Abdominal:      General: There is no distension.   Genitourinary:     Comments: Torres catheterization  Musculoskeletal:         General: No signs of injury.      Cervical back: Normal range of motion and neck supple.   Skin:     General: Skin is dry.   Neurological:      General: No focal deficit present.      Mental Status: He is alert. Mental status is at baseline.   Psychiatric:         Behavior: Behavior normal.     Review of Systems   Unable to perform ROS: Psychiatric disorder     Vents:  Vent Mode: A/C (01/01/23 0920)  Ventilator Initiated: Yes (12/30/22 0436)  Set Rate: 18 BPM (01/01/23 0920)  Vt Set: 450 mL (01/01/23 0920)  Pressure Support: 0 cmH20 (01/01/23 0920)  PEEP/CPAP: 5 cmH20 (01/01/23 0920)  Oxygen Concentration (%): 30 (01/01/23 1030)  Peak Airway Pressure: 18 cmH20 (01/01/23 0920)  Plateau Pressure: 12 cmH20 (01/01/23 0920)  Total Ve: 8.9 L/m (01/01/23 0920)  F/VT Ratio<105 (RSBI): (!) 39.91 (01/01/23 0920)    Lines/Drains/Airways       Drain  Duration                  Urethral Catheter 12/30/22 0557 16 Fr. 3 days              Peripheral Intravenous Line  Duration                   Peripheral IV - Single Lumen 12/30/22 0559 18 G Anterior;Distal;Left Forearm 3 days         Peripheral IV - Single Lumen 12/30/22 0601 18 G Anterior;Left;Proximal Forearm 3 days         Peripheral IV - Single Lumen 12/30/22 0631 18 G Posterior;Left Hand 3 days                    Significant Labs:    CBC/Anemia Profile:  Recent Labs   Lab 01/01/23  0438   WBC 7.73   HGB 10.6*   HCT 32.2*      MCV 89   RDW 13.4        Chemistries:  Recent Labs   Lab 01/01/23  0438      K 3.1*   *   CO2 20*   BUN 9   CREATININE 0.7   CALCIUM 8.2*   ALBUMIN 2.3*   PROT 5.2*   BILITOT 0.2   ALKPHOS 64   ALT 14   AST 30     EKG December 30, 2022      Vent. Rate : 080 BPM     Atrial Rate : 080 BPM      P-R Int : 102 ms          QRS Dur : 082 ms       QT Int : 362 ms       P-R-T Axes : 067 084 082 degrees      QTc Int : 417 ms     Sinus rhythm with short IN   Otherwise normal ECG   When compared with ECG of 01-DEC-2018 17:15,   No significant change was found       Significant Imaging:    Chest x-ray 12/30/2022  FINDINGS:  Single view of the chest.  Comparison 06/03/2019.  Endotracheal tube and nasogastric tube appear appropriate.     Cardiac silhouette is normal.  No acute infiltrate.  Chronic interstitial opacities are seen within the lungs.  Mild apical pleural thickening.  The lungs demonstrate no evidence of active disease.  No evidence of pleural effusion or pneumothorax.  Bones appear intact.  Suspect remote right-sided rib fractures.  Linear radiodensity seen overlying the upper abdomen of unknown clinical significance and not fully localized on AP view.  Correlation for foreign body recommended.           ABG  Recent Labs   Lab 01/01/23  0314   PH 7.425   PO2 127*   PCO2 31.4*   HCO3 20.6*   BE -4     Assessment/Plan:     Psychiatric  * Acute psychosis  Suspect polysubstance use - daily ETOH + amphetamine use  CT head without acute findings  Cont scheduled valium   Daily and prn SAT for evaluation of  "extubation readiness and neuro exam  Will transition just to precedex in attempts to extubate   If unable to extubate, will start tube feeds  01/02/2023 IV Precedex    Polysubstance abuse  Family reports tobacco, ETOH, heroin use; UDS positive THC and amphetamine  Thiamine and folic acid   Report was "detoxing from heroin"; if desires cessation post extubation and amenable - inpt psych/drug detox program would be appropriate once medically stable      Pulmonary  Respiratory failure status post extubation  Intubated for airway protection to allow adequate sedation  Vent settings reviewed and adjusted to optimize gas exchange  VAP prophylaxis  Changing to precedex, stopping propofol and fentanyl- will assess mental status and agitation after transition and assess for ability to extubate   SBT this am with some periods of apnea- though suspect sedation related, will assess again when propofol and fentanyl have been off for a few hours  1/2 extubated yesterday.  Nebs p.r.n..  Target O2 sat 92-94%    Oncology  Leukocytosis  Suspect reactive   No further fevers, WBC normalized   BC negative so far  DC vancomycin     Orthopedic  Non-traumatic rhabdomyolysis  S/t hyperactive psychosis; suspect amphetamine related  Sedation   CPK down trended, renal function preserved with good urine output  Stop IVFs   Follow labs   1/2 not oliguric.  DC Melissa.  Encourage p.o. intake       Critical Care Daily Checklist:    A: Awake: RASS Goal/Actual Goal: RASS Goal: -1-->drowsy  Actual: Patricio Agitation Sedation Scale (RASS): Alert and calm   B: Spontaneous Breathing Trial Performed? Spon. Breathing Trial Initiated?: Initiated (01/01/23 1045)   C: SAT & SBT Coordinated?  Not intubated                      D: Delirium: CAM-ICU Overall CAM-ICU: Positive   E: Early Mobility Performed? Yes   F: Feeding Goal:    Status:     Current Diet Order   Procedures    Diet Adult Regular (IDDSI Level 7)      AS: Analgesia/Sedation Precedex drip   T: " Thromboembolic Prophylaxis Lovenox   H: HOB > 300 Yes   U: Stress Ulcer Prophylaxis (if needed) Famotidine   G: Glucose Control Fingerstick as needed   B: Bowel Function     I: Indwelling Catheter (Lines & Torres) Necessity Remove Torres   D: De-escalation of Antimicrobials/Pharmacotherapies No antibiotics    Plan for the day/ETD Wean Precedex    Code Status:  Family/Goals of Care: Full Code       Critical Care Time: 35 minutes  Critical secondary to Patient has a condition that poses threat to life and bodily function:  Drug Overdose complicated by rhabdomyolysis and respiratory failure      Critical care was time spent personally by me on the following activities: development of treatment plan with patient or surrogate and bedside caregivers, discussions with consultants, evaluation of patient's response to treatment, examination of patient, ordering and performing treatments and interventions, ordering and review of laboratory studies, ordering and review of radiographic studies, pulse oximetry, re-evaluation of patient's condition. This critical care time did not overlap with that of any other provider or involve time for any procedures.     Carolina Ramsey MD  Critical Care Medicine  [facility] - Intensive Care ([facility])

## 2023-01-02 NOTE — PLAN OF CARE
PIV x 3 d/c'd from pt; cath tip intact.  Pt given discharge instructions; reviewed; questions answered; understanding verbalized.  Pt discharged home via wheelchair to personal vehicle accompanied by Dad, Lucas Bernal, .  Pt provided with paper scrubs, as his jeans were cut off of him and he could not find his shirt.  PIO TAYLOR.

## 2023-01-02 NOTE — ASSESSMENT & PLAN NOTE
Suspect polysubstance use - daily ETOH + amphetamine use  CT head without acute findings  Cont scheduled valium   Daily and prn SAT for evaluation of extubation readiness and neuro exam  Will transition just to precedex in attempts to extubate   If unable to extubate, will start tube feeds  01/02/2023 IV Precedex

## 2023-01-02 NOTE — SUBJECTIVE & OBJECTIVE
Interval History: 01/02/2023 events noted.  Verbally abusive.  On Precedex drip afebrile O2 sat 100% urine output 900 mL extubated yesterday      Objective:     Vital Signs (Most Recent):  Temp: 98.8 °F (37.1 °C) (01/02/23 0357)  Pulse: 60 (01/02/23 0742)  Resp: (!) 21 (01/02/23 0855)  BP: (!) 162/84 (01/02/23 0600)  SpO2: 95 % (01/02/23 0742)   Vital Signs (24h Range):  Temp:  [98 °F (36.7 °C)-98.8 °F (37.1 °C)] 98.8 °F (37.1 °C)  Pulse:  [60-97] 60  Resp:  [12-29] 21  SpO2:  [92 %-100 %] 95 %  BP: (110-183)/() 162/84     Weight: 51.2 kg (112 lb 14 oz)  Body mass index is 18.22 kg/m².      Intake/Output Summary (Last 24 hours) at 1/2/2023 1123  Last data filed at 1/2/2023 0600  Gross per 24 hour   Intake 776.42 ml   Output 715 ml   Net 61.42 ml       Physical Exam  Vitals and nursing note reviewed.   Constitutional:       General: He is not in acute distress.     Appearance: He is well-developed. He is ill-appearing.   HENT:      Head: Normocephalic and atraumatic.      Nose: Nose normal.   Eyes:      Extraocular Movements: Extraocular movements intact.   Cardiovascular:      Rate and Rhythm: Normal rate and regular rhythm.   Pulmonary:      Effort: Pulmonary effort is normal. No respiratory distress.      Breath sounds: No stridor.   Abdominal:      General: There is no distension.   Genitourinary:     Comments: Torres catheterization  Musculoskeletal:         General: No signs of injury.      Cervical back: Normal range of motion and neck supple.   Skin:     General: Skin is dry.   Neurological:      General: No focal deficit present.      Mental Status: He is alert. Mental status is at baseline.   Psychiatric:         Behavior: Behavior normal.     Review of Systems   Unable to perform ROS: Psychiatric disorder     Vents:  Vent Mode: A/C (01/01/23 0920)  Ventilator Initiated: Yes (12/30/22 9519)  Set Rate: 18 BPM (01/01/23 0920)  Vt Set: 450 mL (01/01/23 0920)  Pressure Support: 0 cmH20 (01/01/23  0920)  PEEP/CPAP: 5 cmH20 (01/01/23 0920)  Oxygen Concentration (%): 30 (01/01/23 1030)  Peak Airway Pressure: 18 cmH20 (01/01/23 0920)  Plateau Pressure: 12 cmH20 (01/01/23 0920)  Total Ve: 8.9 L/m (01/01/23 0920)  F/VT Ratio<105 (RSBI): (!) 39.91 (01/01/23 0920)    Lines/Drains/Airways       Drain  Duration                  Urethral Catheter 12/30/22 0557 16 Fr. 3 days              Peripheral Intravenous Line  Duration                  Peripheral IV - Single Lumen 12/30/22 0559 18 G Anterior;Distal;Left Forearm 3 days         Peripheral IV - Single Lumen 12/30/22 0601 18 G Anterior;Left;Proximal Forearm 3 days         Peripheral IV - Single Lumen 12/30/22 0631 18 G Posterior;Left Hand 3 days                    Significant Labs:    CBC/Anemia Profile:  Recent Labs   Lab 01/01/23 0438   WBC 7.73   HGB 10.6*   HCT 32.2*      MCV 89   RDW 13.4        Chemistries:  Recent Labs   Lab 01/01/23 0438      K 3.1*   *   CO2 20*   BUN 9   CREATININE 0.7   CALCIUM 8.2*   ALBUMIN 2.3*   PROT 5.2*   BILITOT 0.2   ALKPHOS 64   ALT 14   AST 30     EKG December 30, 2022      Vent. Rate : 080 BPM     Atrial Rate : 080 BPM      P-R Int : 102 ms          QRS Dur : 082 ms       QT Int : 362 ms       P-R-T Axes : 067 084 082 degrees      QTc Int : 417 ms     Sinus rhythm with short [place]   Otherwise normal ECG   When compared with ECG of 01-DEC-2018 17:15,   No significant change was found       Significant Imaging:    Chest x-ray 12/30/2022  FINDINGS:  Single view of the chest.  Comparison 06/03/2019.  Endotracheal tube and nasogastric tube appear appropriate.     Cardiac silhouette is normal.  No acute infiltrate.  Chronic interstitial opacities are seen within the lungs.  Mild apical pleural thickening.  The lungs demonstrate no evidence of active disease.  No evidence of pleural effusion or pneumothorax.  Bones appear intact.  Suspect remote right-sided rib fractures.  Linear radiodensity seen overlying the upper  abdomen of unknown clinical significance and not fully localized on AP view.  Correlation for foreign body recommended.

## 2023-01-02 NOTE — NURSING
Pt extubated this am after passing. SAT and SBT. Weaned off fent and prop to precedex with RASS goal of of 0. Pt increasingly agitated through out shift. BUE soft restraints Dc'd after pt able to demonstrate he will not remove Ivs. Pt refusing NC, satting at 96 on RA. D10 bolus x2 given for hypoglycemia. Pt now refusing CBG, NP and MD aware. Pt refusing food, able to get him to drink juice. Stating he wants to go home. Voiding per calle. Weaning precedex gtt per MD. Scheduled Diazepam given and IV Diazepam available for PRN use. POC reviewed with patient and son Artis.

## 2023-01-02 NOTE — PLAN OF CARE
Titration of precedex per order. PRN treatment of pain and agitation per order, see MAR. Pt refused AM labs. Pt remains frustrated, agitated, and verbally aggressive. Torres intact. POC reviewed with patient. Safety and fall precautions maintained.

## 2023-01-02 NOTE — DISCHARGE SUMMARY
[facility] - Intensive Care ([facility])  Critical Care Medicine  Discharge Summary      Patient Name: Lucas Bernal II  MRN: [id number]  Admission Date: 12/29/2022  Hospital Length of Stay: 3 days  Discharge Date and Time:  01/02/2023 3:28 PM  Attending Physician: Carolina Ramsey MD   Discharging Provider: Carolina Ramsey MD  Primary Care Provider: Primary Doctor No  Reason for Admission:  ETOH abuse    HPI:   52 year old male with known h/o heroin use and daily ETOH along with every day tobacco smoker    Presents to ED via EMS called when found altered saying that he could not breathe and febrile (102) by son; last seen normal in morning  Arrived combative, screaming, cursing  Attempted sedation with ativan total 14 mg; benadryl; haldol; zyprexa; and precedex. Ultimately required intubation for sedation    Labs revealed leukocytosis 18k with negative UA, CXR, and CT head  Lactate 3.6; CPK trending up 351>1931; CO2 18  UDS positive benzo (given prior to screen) along with THC and amphetamine    Critical care team contacted for admission for ongoing management and mechanical vent support  Seen and examined in ED       * No surgery found *    Indwelling Lines/Drains at Time of Discharge:   Lines/Drains/Airways     None               Hospital Course:   12/31- ongoing agitation overnight despite significant sedation- not redirectable or following commands and fentanyl was increased; CPK down trending, renal function normal with good urine output, no fevers     1/1- ongoing agitation yesterday when sedation lowered; valium added. SAT/SBT today   01/02/2023 events noted.  Verbally abusive.  On Precedex drip afebrile O2 sat 100% urine output 900 mL extubated yesterday  1/2 patient a and O x3.   contacted by RN.  Father will  patient and will  medicines from pharmacy            Pending Diagnostic Studies:     Procedure Component Value Units Date/Time    CBC auto differential [[id number]]      Order Status: Sent Lab Status: No result     Specimen: Blood     Comprehensive metabolic panel [[id number]]     Order Status: Sent Lab Status: No result     Specimen: Blood         Final Active Diagnoses:    Diagnosis Date Noted POA    PRINCIPAL PROBLEM:  Acute psychosis [F23] 12/30/2022 Yes    Respiratory failure status post extubation [Z99.11] 12/30/2022 Not Applicable    Polysubstance abuse [F19.10] 12/30/2022 Yes    Leukocytosis [D72.829] 12/30/2022 Yes    Non-traumatic rhabdomyolysis [M62.82] 12/30/2022 Yes      Problems Resolved During this Admission:     No new Assessment & Plan notes have been filed under this hospital service since the last note was generated.  Service: Pulmonology    Discharged Condition: stable    Disposition:      home  Patient Instructions:      Ambulatory referral/consult to Internal Medicine   Standing Status: Future   Referral Priority: Routine Referral Type: Consultation   Referral Reason: Specialty Services Required   Requested Specialty: Internal Medicine   Number of Visits Requested: 1     Medications:  Reconciled Home Medications:      Medication List      START taking these medications    chlordiazepoxide 25 MG Cap  Commonly known as: LIBRIUM  Take 1 capsule (25 mg total) by mouth every 6 (six) hours.     folic acid 1 MG tablet  Commonly known as: FOLVITE  Take 1 tablet (1 mg total) by mouth once daily.  Start taking on: January 3, 2023     thiamine 100 MG tablet  Take 1 tablet (100 mg total) by mouth once daily.  Start taking on: January 3, 2023             Carolina Ramsey MD  Critical Care Medicine  [facility] - Intensive Care \[facility] [place] [facility]\"")

## 2023-01-04 LAB
BACTERIA BLD CULT: NORMAL
BACTERIA BLD CULT: NORMAL

## 2024-01-11 NOTE — HPI
47 yo M smoker with hepatitis-C and prior traumatic right-sided pneumothorax with associated rib fractures approximately 4 years ago presented to the ED last night status post fall from standing onto a camping chair with resultant right-sided rib fractures and pneumothorax.  He reported shortness of breath at rest after the injury. He currently complains of right-sided rib pain and pleuritic chest pain. He denies any fevers, chills, cough, or congestion.  Upon review of care every where he was admitted to [facility] in March of 2016 with a necrotizing pneumonia which required pigtail drainage of a abscess of the right upper lobe.  At that time all imaging of his lungs showed emphysematous changes.  He is still a current smoker.   Exercise nuclear stress test ordered.    [facility] for s/o Ruba stating recommendations, phone # for writer, and phone # for scheduling.  Asked for call back with questions.  -ral    ----- Message -----  From: Heri Henriquez MD  Sent: 1/10/2024   5:44 PM CST  To: Kylie Orourke RN    I am more then happy to see him, I know him personally.  In interim can we get exercise nuke with no beta blker for 2 days?  LF    ----- Message -----  From: Alan Dotson MD  Sent: 1/10/2024   6:03 PM CST  To: Kylie Orourke, RN    Kylie,  You can order an exercise NM stress test. Okay to hold b-blocker if he is on one.  Thank you  SYLVIA